# Patient Record
Sex: FEMALE | Race: WHITE | NOT HISPANIC OR LATINO | URBAN - METROPOLITAN AREA
[De-identification: names, ages, dates, MRNs, and addresses within clinical notes are randomized per-mention and may not be internally consistent; named-entity substitution may affect disease eponyms.]

---
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
## 2022-10-12 ENCOUNTER — APPOINTMENT (OUTPATIENT)
Dept: ULTRASOUND IMAGING | Facility: HOSPITAL | Age: 22
End: 2022-10-12

## 2022-10-12 ENCOUNTER — HOSPITAL ENCOUNTER (EMERGENCY)
Facility: HOSPITAL | Age: 22
Discharge: HOME | End: 2022-10-12
Attending: EMERGENCY MEDICINE | Admitting: EMERGENCY MEDICINE

## 2022-10-12 VITALS
WEIGHT: 173.72 LBS | HEIGHT: 66 IN | DIASTOLIC BLOOD PRESSURE: 84 MMHG | OXYGEN SATURATION: 100 % | RESPIRATION RATE: 19 BRPM | BODY MASS INDEX: 27.92 KG/M2 | HEART RATE: 116 BPM | SYSTOLIC BLOOD PRESSURE: 144 MMHG | TEMPERATURE: 98.2 F

## 2022-10-12 DIAGNOSIS — N83.202 LEFT OVARIAN CYST: Primary | ICD-10-CM

## 2022-10-12 LAB
ALBUMIN SERPL-MCNC: 4.9 G/DL (ref 3.5–5.2)
ALBUMIN/GLOB SERPL: 1.6 G/DL
ALP SERPL-CCNC: 96 U/L (ref 39–117)
ALT SERPL W P-5'-P-CCNC: 11 U/L (ref 1–33)
ANION GAP SERPL CALCULATED.3IONS-SCNC: 11.1 MMOL/L (ref 5–15)
AST SERPL-CCNC: 14 U/L (ref 1–32)
BACTERIA UR QL AUTO: ABNORMAL /HPF
BASOPHILS # BLD AUTO: 0.06 10*3/MM3 (ref 0–0.2)
BASOPHILS NFR BLD AUTO: 0.5 % (ref 0–1.5)
BILIRUB SERPL-MCNC: 0.2 MG/DL (ref 0–1.2)
BILIRUB UR QL STRIP: NEGATIVE
BUN SERPL-MCNC: 6 MG/DL (ref 6–20)
BUN/CREAT SERPL: 7.8 (ref 7–25)
CALCIUM SPEC-SCNC: 9.3 MG/DL (ref 8.6–10.5)
CHLORIDE SERPL-SCNC: 105 MMOL/L (ref 98–107)
CLARITY UR: CLEAR
CO2 SERPL-SCNC: 24.9 MMOL/L (ref 22–29)
COLOR UR: YELLOW
CREAT SERPL-MCNC: 0.77 MG/DL (ref 0.57–1)
DEPRECATED RDW RBC AUTO: 39.8 FL (ref 37–54)
EGFRCR SERPLBLD CKD-EPI 2021: 113.4 ML/MIN/1.73
EOSINOPHIL # BLD AUTO: 0.09 10*3/MM3 (ref 0–0.4)
EOSINOPHIL NFR BLD AUTO: 0.8 % (ref 0.3–6.2)
ERYTHROCYTE [DISTWIDTH] IN BLOOD BY AUTOMATED COUNT: 12.3 % (ref 12.3–15.4)
GLOBULIN UR ELPH-MCNC: 3 GM/DL
GLUCOSE BLDC GLUCOMTR-MCNC: 78 MG/DL (ref 70–99)
GLUCOSE SERPL-MCNC: 113 MG/DL (ref 65–99)
GLUCOSE UR STRIP-MCNC: NEGATIVE MG/DL
HCG INTACT+B SERPL-ACNC: <0.5 MIU/ML
HCT VFR BLD AUTO: 41.6 % (ref 34–46.6)
HGB BLD-MCNC: 14.2 G/DL (ref 12–15.9)
HGB UR QL STRIP.AUTO: NEGATIVE
HOLD SPECIMEN: NORMAL
HOLD SPECIMEN: NORMAL
HYALINE CASTS UR QL AUTO: ABNORMAL /LPF
IMM GRANULOCYTES # BLD AUTO: 0.02 10*3/MM3 (ref 0–0.05)
IMM GRANULOCYTES NFR BLD AUTO: 0.2 % (ref 0–0.5)
KETONES UR QL STRIP: NEGATIVE
LEUKOCYTE ESTERASE UR QL STRIP.AUTO: ABNORMAL
LIPASE SERPL-CCNC: 27 U/L (ref 13–60)
LYMPHOCYTES # BLD AUTO: 2.68 10*3/MM3 (ref 0.7–3.1)
LYMPHOCYTES NFR BLD AUTO: 22.6 % (ref 19.6–45.3)
MCH RBC QN AUTO: 29.9 PG (ref 26.6–33)
MCHC RBC AUTO-ENTMCNC: 34.1 G/DL (ref 31.5–35.7)
MCV RBC AUTO: 87.6 FL (ref 79–97)
MONOCYTES # BLD AUTO: 0.42 10*3/MM3 (ref 0.1–0.9)
MONOCYTES NFR BLD AUTO: 3.5 % (ref 5–12)
NEUTROPHILS NFR BLD AUTO: 72.4 % (ref 42.7–76)
NEUTROPHILS NFR BLD AUTO: 8.57 10*3/MM3 (ref 1.7–7)
NITRITE UR QL STRIP: NEGATIVE
NRBC BLD AUTO-RTO: 0 /100 WBC (ref 0–0.2)
PH UR STRIP.AUTO: 6.5 [PH] (ref 5–8)
PLATELET # BLD AUTO: 411 10*3/MM3 (ref 140–450)
PMV BLD AUTO: 9.5 FL (ref 6–12)
POTASSIUM SERPL-SCNC: 3.9 MMOL/L (ref 3.5–5.2)
PROT SERPL-MCNC: 7.9 G/DL (ref 6–8.5)
PROT UR QL STRIP: NEGATIVE
RBC # BLD AUTO: 4.75 10*6/MM3 (ref 3.77–5.28)
RBC # UR STRIP: ABNORMAL /HPF
REF LAB TEST METHOD: ABNORMAL
SODIUM SERPL-SCNC: 141 MMOL/L (ref 136–145)
SP GR UR STRIP: <=1.005 (ref 1–1.03)
SQUAMOUS #/AREA URNS HPF: ABNORMAL /HPF
UROBILINOGEN UR QL STRIP: ABNORMAL
WBC # UR STRIP: ABNORMAL /HPF
WBC NRBC COR # BLD: 11.84 10*3/MM3 (ref 3.4–10.8)
WHOLE BLOOD HOLD COAG: NORMAL
WHOLE BLOOD HOLD SPECIMEN: NORMAL

## 2022-10-12 PROCEDURE — 85025 COMPLETE CBC W/AUTO DIFF WBC: CPT

## 2022-10-12 PROCEDURE — 84702 CHORIONIC GONADOTROPIN TEST: CPT

## 2022-10-12 PROCEDURE — 99283 EMERGENCY DEPT VISIT LOW MDM: CPT

## 2022-10-12 PROCEDURE — 82962 GLUCOSE BLOOD TEST: CPT

## 2022-10-12 PROCEDURE — 83690 ASSAY OF LIPASE: CPT

## 2022-10-12 PROCEDURE — 81001 URINALYSIS AUTO W/SCOPE: CPT

## 2022-10-12 PROCEDURE — 80053 COMPREHEN METABOLIC PANEL: CPT

## 2022-10-12 PROCEDURE — 36415 COLL VENOUS BLD VENIPUNCTURE: CPT

## 2022-10-12 PROCEDURE — 76830 TRANSVAGINAL US NON-OB: CPT

## 2022-10-12 RX ORDER — SODIUM CHLORIDE 0.9 % (FLUSH) 0.9 %
10 SYRINGE (ML) INJECTION AS NEEDED
Status: DISCONTINUED | OUTPATIENT
Start: 2022-10-12 | End: 2022-10-12 | Stop reason: HOSPADM

## 2022-10-12 NOTE — ED PROVIDER NOTES
Time: 4:46 PM EDT  Chief Complaint:   Chief Complaint   Patient presents with   • Abdominal Pain   • Dizziness           History of Present Illness:  Patient is a 22 y.o. year old female who presents to the emergency department with L pelvic pain x 2 weeks. LMP august 20th, states regular cycles. No birth control. No vaginal bleeding. Hx of endometriosis, Denies ovation cyst.             History provided by:  Patient  Abdominal Pain  Pain location:  LLQ  Pain quality: cramping    Pain radiates to:  Does not radiate  Pain severity:  Moderate  Onset quality:  Sudden  Duration:  2 weeks  Timing:  Intermittent  Progression:  Unchanged  Chronicity:  New  Context: not alcohol use, not awakening from sleep, not diet changes, not eating, not laxative use, not medication withdrawal, not previous surgeries, not recent illness, not recent sexual activity, not recent travel, not retching, not sick contacts, not suspicious food intake and not trauma    Relieved by:  Nothing  Worsened by:  Nothing  Ineffective treatments:  None tried  Associated symptoms: nausea    Associated symptoms: no anorexia, no belching, no chest pain, no chills, no constipation, no cough, no diarrhea, no dysuria, no fatigue, no fever, no flatus, no hematemesis, no hematochezia, no hematuria, no melena, no shortness of breath, no sore throat, no vaginal bleeding, no vaginal discharge and no vomiting            Patient Care Team  Primary Care Provider: Provider, No Known    Past Medical History:     No Known Allergies  No past medical history on file.  No past surgical history on file.  Family History   Problem Relation Age of Onset   • Diabetes Mother    • Heart failure Mother        Home Medications:  Prior to Admission medications    Medication Sig Start Date End Date Taking? Authorizing Provider   hydrOXYzine (ATARAX) 25 MG tablet Take 25 mg by mouth 3 (Three) Times a Day As Needed for Itching.    Emergency, Nurse Ganesh, RN   metoprolol tartrate  "(LOPRESSOR) 25 MG tablet Take 25 mg by mouth 2 (Two) Times a Day.    Emergency, Nurse Ganesh, RN        Social History:   Social History     Tobacco Use   • Smoking status: Never   • Smokeless tobacco: Never         Review of Systems:  Review of Systems   Constitutional: Negative for chills, fatigue and fever.   HENT: Negative for congestion, ear pain and sore throat.    Eyes: Negative for pain.   Respiratory: Negative for cough, chest tightness and shortness of breath.    Cardiovascular: Negative for chest pain.   Gastrointestinal: Positive for abdominal pain and nausea. Negative for anorexia, constipation, diarrhea, flatus, hematemesis, hematochezia, melena and vomiting.   Genitourinary: Positive for pelvic pain. Negative for dysuria, flank pain, hematuria, vaginal bleeding and vaginal discharge.   Musculoskeletal: Negative for joint swelling.   Skin: Negative for pallor.   Neurological: Negative for seizures and headaches.   All other systems reviewed and are negative.       Physical Exam:  /84 (BP Location: Left arm, Patient Position: Sitting)   Pulse 116   Temp 98.2 °F (36.8 °C) (Oral)   Resp 19   Ht 167.6 cm (66\")   Wt 78.8 kg (173 lb 11.6 oz)   SpO2 100%   BMI 28.04 kg/m²     Physical Exam  Vitals and nursing note reviewed.   Constitutional:       General: She is not in acute distress.     Appearance: Normal appearance. She is not toxic-appearing.   HENT:      Head: Normocephalic and atraumatic.      Mouth/Throat:      Mouth: Mucous membranes are moist.   Eyes:      General: No scleral icterus.     Pupils: Pupils are equal, round, and reactive to light.   Cardiovascular:      Rate and Rhythm: Normal rate and regular rhythm.      Pulses: Normal pulses.      Heart sounds: Normal heart sounds.   Pulmonary:      Effort: Pulmonary effort is normal. No respiratory distress.      Breath sounds: Normal breath sounds.   Abdominal:      General: Abdomen is flat. There is no distension.      Palpations: " Abdomen is soft.      Tenderness: There is no abdominal tenderness.   Musculoskeletal:         General: Normal range of motion.      Cervical back: Normal range of motion and neck supple.   Skin:     General: Skin is warm and dry.   Neurological:      General: No focal deficit present.      Mental Status: She is alert and oriented to person, place, and time. Mental status is at baseline.   Psychiatric:         Mood and Affect: Mood normal.         Behavior: Behavior normal.                Medications in the Emergency Department:  Medications   sodium chloride 0.9 % flush 10 mL (has no administration in time range)        Labs  Lab Results (last 24 hours)     Procedure Component Value Units Date/Time    CBC & Differential [[id number]]  (Abnormal) Collected: 10/12/22 1552    Specimen: Blood Updated: 10/12/22 1600    Narrative:      The following orders were created for panel order CBC & Differential.  Procedure                               Abnormality         Status                     ---------                               -----------         ------                     CBC Auto Differential[[id number]]        Abnormal            Final result                 Please view results for these tests on the individual orders.    Comprehensive Metabolic Panel [[id number]]  (Abnormal) Collected: 10/12/22 1552    Specimen: Blood Updated: 10/12/22 1621     Glucose 113 mg/dL      BUN 6 mg/dL      Creatinine 0.77 mg/dL      Sodium 141 mmol/L      Potassium 3.9 mmol/L      Chloride 105 mmol/L      CO2 24.9 mmol/L      Calcium 9.3 mg/dL      Total Protein 7.9 g/dL      Albumin 4.90 g/dL      ALT (SGPT) 11 U/L      AST (SGOT) 14 U/L      Alkaline Phosphatase 96 U/L      Total Bilirubin 0.2 mg/dL      Globulin 3.0 gm/dL      A/G Ratio 1.6 g/dL      BUN/Creatinine Ratio 7.8     Anion Gap 11.1 mmol/L      eGFR 113.4 mL/min/1.73      Comment: National Kidney Foundation and American Society of Nephrology (ASN) Task Force recommended  calculation based on the Chronic Kidney Disease Epidemiology Collaboration (CKD-EPI) equation refit without adjustment for race.       Narrative:      GFR Normal >60  Chronic Kidney Disease <60  Kidney Failure <15      Lipase [[id number]]  (Normal) Collected: 10/12/22 1552    Specimen: Blood Updated: 10/12/22 1621     Lipase 27 U/L     hCG, Quantitative, Pregnancy [[id number]] Collected: 10/12/22 1552    Specimen: Blood Updated: 10/12/22 1619     HCG Quantitative <0.50 mIU/mL     Narrative:      HCG Ranges by Gestational Age    Females - non-pregnant premenopausal   </= 1mIU/mL HCG  Females - postmenopausal               </= 7mIU/mL HCG    3 Weeks       5.4   -      72 mIU/mL  4 Weeks      10.2   -     708 mIU/mL  5 Weeks       217   -   8,245 mIU/mL  6 Weeks       152   -  32,177 mIU/mL  7 Weeks     4,059   - 153,767 mIU/mL  8 Weeks    31,366   - 149,094 mIU/mL  9 Weeks    59,109   - 135,901 mIU/mL  10 Weeks   44,186   - 170,409 mIU/mL  12 Weeks   27,107   - 201,615 mIU/mL  14 Weeks   24,302   -  93,646 mIU/mL  15 Weeks   12,540   -  69,747 mIU/mL  16 Weeks    8,904   -  55,332 mIU/mL  17 Weeks    8,240   -  51,793 mIU/mL  18 Weeks    9,649   -  55,271 mIU/mL    Results may be falsely decreased if patient taking Biotin.      CBC Auto Differential [[id number]]  (Abnormal) Collected: 10/12/22 1552    Specimen: Blood Updated: 10/12/22 1600     WBC 11.84 10*3/mm3      RBC 4.75 10*6/mm3      Hemoglobin 14.2 g/dL      Hematocrit 41.6 %      MCV 87.6 fL      MCH 29.9 pg      MCHC 34.1 g/dL      RDW 12.3 %      RDW-SD 39.8 fl      MPV 9.5 fL      Platelets 411 10*3/mm3      Neutrophil % 72.4 %      Lymphocyte % 22.6 %      Monocyte % 3.5 %      Eosinophil % 0.8 %      Basophil % 0.5 %      Immature Grans % 0.2 %      Neutrophils, Absolute 8.57 10*3/mm3      Lymphocytes, Absolute 2.68 10*3/mm3      Monocytes, Absolute 0.42 10*3/mm3      Eosinophils, Absolute 0.09 10*3/mm3      Basophils, Absolute 0.06 10*3/mm3      Immature  Grans, Absolute 0.02 10*3/mm3      nRBC 0.0 /100 WBC     Urinalysis With Microscopic If Indicated (No Culture) - Urine, Clean Catch [[id number]]  (Abnormal) Collected: 10/12/22 1623    Specimen: Urine, Clean Catch Updated: 10/12/22 1635     Color, UA Yellow     Appearance, UA Clear     pH, UA 6.5     Specific Gravity, UA <=1.005     Glucose, UA Negative     Ketones, UA Negative     Bilirubin, UA Negative     Blood, UA Negative     Protein, UA Negative     Leuk Esterase, UA Trace     Nitrite, UA Negative     Urobilinogen, UA 0.2 E.U./dL    Urinalysis, Microscopic Only - Urine, Clean Catch [[id number]]  (Abnormal) Collected: 10/12/22 1623    Specimen: Urine, Clean Catch Updated: 10/12/22 1635     RBC, UA 0-2 /HPF      WBC, UA 0-2 /HPF      Bacteria, UA None Seen /HPF      Squamous Epithelial Cells, UA 0-2 /HPF      Hyaline Casts, UA None Seen /LPF      Methodology Automated Microscopy    POC Glucose Once [[id number]]  (Normal) Collected: 10/12/22 2031    Specimen: Blood Updated: 10/12/22 2033     Glucose 78 mg/dL      Comment: Serial Number: [id number]:  [id number]              Imaging:  US Non-ob Transvaginal    Result Date: 10/12/2022  PROCEDURE: US NON-OB TRANSVAGINAL  COMPARISON: None  INDICATIONS: pelvic pain  TECHNIQUE: Ultrasound examination of the pelvis was performed, using endovaginal technique.   FINDINGS:  The uterus measures 7.3 cm x 3.7 cm by 4.5 cm.  No focal uterine abnormality is seen.  The endometrial echo measures 1.0 cm in thickness which is within normal limits for a patient of this age.  A small amount of free pelvic fluid is noted.  The right ovary appears within normal limits with no cyst or mass identified.  Normal ovarian blood flow is noted.  On the left ovary is a 1.3 cm cystic focus .  There is prominent circumferential low blood flow around the focus.  No embryo is identified within the cyst on the provided images.  Normal blood flow is noted elsewhere in the ovary.        1. 1.3 cm  cystic focus on the left ovary may represent a corpus luteal cyst or ectopic pregnancy.  Correlate with hCG levels. 2. Small amount of free pelvic fluid     Derrick Joshua M.D.       Electronically Signed and Approved By: Derrick Joshua M.D. on 10/12/2022 at 17:48               Procedures:  Procedures    Progress  ED Course as of 10/12/22 2133   Wed Oct 12, 2022   1648 --- PROVIDER IN TRIAGE NOTE ---    The patient was evaluated my meAntonia in triage. Orders were placed and the patient is currently awaiting disposition.    [AJ]      ED Course User Index  [AJ] Antonia Bird PA-C                            The patient was initially evaluated in the triage area where orders were placed. The patient was later dispositioned by TYLER Anthony.      The patient was advised to stay for completion of workup which includes but is not limited to communication of labs and radiological results, reassessment and plan. The patient was advised that leaving prior to disposition by a provider could result in critical findings that are not communicated to the patient.     Medical Decision Making:  MDM  Number of Diagnoses or Management Options  Left ovarian cyst: new and requires workup  Diagnosis management comments: The patient is resting comfortably and feels better, is alert and in no distress. Repeat examination is unremarkable and benign; in particular, there's no discomfort at McBurney's point and there is no pulsatile mass. The history, exam, diagnostic testing, and current condition does not suggest acute appendicitis, bowel obstruction, acute cholecystitis, bowel perforation, major gastrointestinal bleeding, severe diverticulitis, abdominal aortic aneurysm, mesenteric ischemia, volvulus, sepsis, or other significant pathology that warrants further testing, continued ED treatment, admission, for surgical evaluation at this point. The vital signs have been stable. The patient does not have uncontrollable  pain, intractable vomiting, or other significant symptoms. The patient's condition is stable and appropriate for discharge from the emergency department.       Amount and/or Complexity of Data Reviewed  Clinical lab tests: reviewed  Tests in the radiology section of CPT®: reviewed    Risk of Complications, Morbidity, and/or Mortality  Presenting problems: moderate  Diagnostic procedures: low  Management options: low    Patient Progress  Patient progress: stable           The following orders were placed after triage and evaluation:  Orders Placed This Encounter   Procedures   • US Non-ob Transvaginal   • [place] Draw   • Comprehensive Metabolic Panel   • Lipase   • Urinalysis With Microscopic If Indicated (No Culture) - Urine, Clean Catch   • hCG, Quantitative, Pregnancy   • CBC Auto Differential   • Urinalysis, Microscopic Only - Urine, Clean Catch   • NPO Diet NPO Type: Strict NPO   • Undress & Gown   • POC Glucose Once   • Insert Peripheral IV   • CBC & Differential   • Green Top (Gel)   • Lavender Top   • Gold Top - SST   • Light Blue Top       Final diagnoses:   Left ovarian cyst          Disposition:  ED Disposition     ED Disposition   Discharge    Condition   Stable    Comment   Pt ambulatory to lobby. Denies any needs at this time.             This medical record created using voice recognition software.           Cesar Hanson, APRN  10/12/22 3971

## 2022-10-13 NOTE — DISCHARGE INSTRUCTIONS
Follow up with your Gynecologist for further evalaution. Return to the ER for worsening pain, fever or any concerns. Take Midol/Aleve for pain.

## 2024-04-10 PROCEDURE — 87480 CANDIDA DNA DIR PROBE: CPT | Performed by: NURSE PRACTITIONER

## 2024-04-10 PROCEDURE — 87591 N.GONORRHOEAE DNA AMP PROB: CPT | Performed by: NURSE PRACTITIONER

## 2024-04-10 PROCEDURE — 87491 CHLMYD TRACH DNA AMP PROBE: CPT | Performed by: NURSE PRACTITIONER

## 2024-04-10 PROCEDURE — 87510 GARDNER VAG DNA DIR PROBE: CPT | Performed by: NURSE PRACTITIONER

## 2024-04-10 PROCEDURE — 87660 TRICHOMONAS VAGIN DIR PROBE: CPT | Performed by: NURSE PRACTITIONER

## 2024-04-11 ENCOUNTER — TELEPHONE (OUTPATIENT)
Dept: URGENT CARE | Facility: CLINIC | Age: 24
End: 2024-04-11
Payer: COMMERCIAL

## 2024-04-11 NOTE — TELEPHONE ENCOUNTER
----- Message from TYLER Maya sent at 4/11/2024  2:50 PM EDT -----  Please call the patient regarding her abnormal result.    Please call the patient tell her that she has positive for bacterial vaginosis.  Please let her know that she tested negative for chlamydia, gonorrhea, trichomonas, and yeast.    I have sent in a prescription for the patient for metronidazole otherwise known as Flagyl to be taken twice a day for 7 days.  This was called in to [facility] pharmacy in [place].  Please inform patient that while she is taking this medication she should avoid all alcohol and alcohol-containing products as it may cause a very serious and painful side effect.

## 2024-04-11 NOTE — TELEPHONE ENCOUNTER
----- Message from TYLER Maya sent at 4/11/2024  2:50 PM EDT -----  Please call the patient regarding her abnormal result.    Please call the patient tell her that she has positive for bacterial vaginosis.  Please let her know that she tested negative for chlamydia, gonorrhea, trichomonas, and yeast.    I have sent in a prescription for the patient for metronidazole otherwise known as Flagyl to be taken twice a day for 7 days.  This was called in to [facility] pharmacy in [place].  Please inform patient that while she is taking this medication she should avoid all alcohol and alcohol-containing products as it may cause a very serious and painful side effect.

## 2024-12-31 ENCOUNTER — HOSPITAL ENCOUNTER (EMERGENCY)
Facility: HOSPITAL | Age: 24
Discharge: HOME | End: 2024-12-31
Attending: EMERGENCY MEDICINE
Payer: MEDICAID

## 2024-12-31 ENCOUNTER — APPOINTMENT (OUTPATIENT)
Dept: ULTRASOUND IMAGING | Facility: HOSPITAL | Age: 24
End: 2024-12-31
Payer: MEDICAID

## 2024-12-31 VITALS
OXYGEN SATURATION: 99 % | HEIGHT: 66 IN | BODY MASS INDEX: 30.4 KG/M2 | DIASTOLIC BLOOD PRESSURE: 69 MMHG | HEART RATE: 89 BPM | SYSTOLIC BLOOD PRESSURE: 134 MMHG | RESPIRATION RATE: 18 BRPM | TEMPERATURE: 98.3 F | WEIGHT: 189.15 LBS

## 2024-12-31 DIAGNOSIS — Z34.90 EARLY STAGE OF PREGNANCY: Primary | ICD-10-CM

## 2024-12-31 LAB
ABO GROUP BLD: NORMAL
BACTERIA UR QL AUTO: NORMAL /HPF
BASOPHILS # BLD AUTO: 0.06 10*3/MM3 (ref 0–0.2)
BASOPHILS NFR BLD AUTO: 0.4 % (ref 0–1.5)
BILIRUB UR QL STRIP: NEGATIVE
BLD GP AB SCN SERPL QL: NEGATIVE
CLARITY UR: CLEAR
COLOR UR: YELLOW
DEPRECATED RDW RBC AUTO: 39.5 FL (ref 37–54)
EOSINOPHIL # BLD AUTO: 0.24 10*3/MM3 (ref 0–0.4)
EOSINOPHIL NFR BLD AUTO: 1.8 % (ref 0.3–6.2)
ERYTHROCYTE [DISTWIDTH] IN BLOOD BY AUTOMATED COUNT: 12.2 % (ref 12.3–15.4)
GLUCOSE UR STRIP-MCNC: NEGATIVE MG/DL
HCG INTACT+B SERPL-ACNC: NORMAL MIU/ML
HCT VFR BLD AUTO: 39.8 % (ref 34–46.6)
HGB BLD-MCNC: 13.3 G/DL (ref 12–15.9)
HGB UR QL STRIP.AUTO: NEGATIVE
HOLD SPECIMEN: NORMAL
HOLD SPECIMEN: NORMAL
HYALINE CASTS UR QL AUTO: NORMAL /LPF
IMM GRANULOCYTES # BLD AUTO: 0.04 10*3/MM3 (ref 0–0.05)
IMM GRANULOCYTES NFR BLD AUTO: 0.3 % (ref 0–0.5)
KETONES UR QL STRIP: NEGATIVE
LEUKOCYTE ESTERASE UR QL STRIP.AUTO: ABNORMAL
LYMPHOCYTES # BLD AUTO: 2.83 10*3/MM3 (ref 0.7–3.1)
LYMPHOCYTES NFR BLD AUTO: 20.7 % (ref 19.6–45.3)
MCH RBC QN AUTO: 29.5 PG (ref 26.6–33)
MCHC RBC AUTO-ENTMCNC: 33.4 G/DL (ref 31.5–35.7)
MCV RBC AUTO: 88.2 FL (ref 79–97)
MONOCYTES # BLD AUTO: 0.75 10*3/MM3 (ref 0.1–0.9)
MONOCYTES NFR BLD AUTO: 5.5 % (ref 5–12)
NEUTROPHILS NFR BLD AUTO: 71.3 % (ref 42.7–76)
NEUTROPHILS NFR BLD AUTO: 9.78 10*3/MM3 (ref 1.7–7)
NITRITE UR QL STRIP: NEGATIVE
NRBC BLD AUTO-RTO: 0 /100 WBC (ref 0–0.2)
NUMBER OF DOSES: NORMAL
PH UR STRIP.AUTO: 7 [PH] (ref 5–8)
PLATELET # BLD AUTO: 419 10*3/MM3 (ref 140–450)
PMV BLD AUTO: 9.4 FL (ref 6–12)
PROT UR QL STRIP: NEGATIVE
RBC # BLD AUTO: 4.51 10*6/MM3 (ref 3.77–5.28)
RBC # UR STRIP: NORMAL /HPF
REF LAB TEST METHOD: NORMAL
RH BLD: POSITIVE
SP GR UR STRIP: <=1.005 (ref 1–1.03)
SQUAMOUS #/AREA URNS HPF: NORMAL /HPF
UROBILINOGEN UR QL STRIP: ABNORMAL
WBC # UR STRIP: NORMAL /HPF
WBC NRBC COR # BLD AUTO: 13.7 10*3/MM3 (ref 3.4–10.8)
WHOLE BLOOD HOLD COAG: NORMAL
WHOLE BLOOD HOLD SPECIMEN: NORMAL

## 2024-12-31 PROCEDURE — 99284 EMERGENCY DEPT VISIT MOD MDM: CPT

## 2024-12-31 PROCEDURE — 86900 BLOOD TYPING SEROLOGIC ABO: CPT

## 2024-12-31 PROCEDURE — 86850 RBC ANTIBODY SCREEN: CPT

## 2024-12-31 PROCEDURE — 81001 URINALYSIS AUTO W/SCOPE: CPT

## 2024-12-31 PROCEDURE — 84702 CHORIONIC GONADOTROPIN TEST: CPT

## 2024-12-31 PROCEDURE — 86901 BLOOD TYPING SEROLOGIC RH(D): CPT

## 2024-12-31 PROCEDURE — 76817 TRANSVAGINAL US OBSTETRIC: CPT

## 2024-12-31 PROCEDURE — 85025 COMPLETE CBC W/AUTO DIFF WBC: CPT

## 2024-12-31 PROCEDURE — 36415 COLL VENOUS BLD VENIPUNCTURE: CPT

## 2024-12-31 RX ORDER — SODIUM CHLORIDE 0.9 % (FLUSH) 0.9 %
10 SYRINGE (ML) INJECTION AS NEEDED
Status: DISCONTINUED | OUTPATIENT
Start: 2024-12-31 | End: 2024-12-31

## 2024-12-31 RX ORDER — PNV NO.95/FERROUS FUM/FOLIC AC 28MG-0.8MG
1 TABLET ORAL DAILY
Qty: 30 TABLET | Refills: 0 | Status: SHIPPED | OUTPATIENT
Start: 2024-12-31 | End: 2025-01-30

## 2024-12-31 NOTE — ED PROVIDER NOTES
Time: 2:41 PM EST  Date of encounter:  12/31/2024  Independent Historian/Clinical History and Information was obtained by:   Patient    History is limited by: N/A    Chief Complaint   Patient presents with    Abdominal Pain    Vaginal Bleeding - Pregnant         History of Present Illness:  Patient is a 24 y.o. year old female who presents to the emergency department for evaluation of abdominal pain and vaginal bleeding.  Patient states that she is approximately 6 weeks pregnant and started experiencing abdominal cramping a few days ago, then this morning as she woke up she had a small amount of bleeding when wiping.  Pain is described only in the left lower quadrant.  LMP 11/05/2024.  Patient has her first OB/GYN appointment at the end of January.  Denies fever, nausea, vomiting.  Denies dizziness or lightheadedness.    Patient Care Team  Primary Care Provider: Rosemarie Mathis APRN    Past Medical History:     Allergies   Allergen Reactions    Shrimp Anaphylaxis    Tree Nut Anaphylaxis     Peanuts also     Past Medical History:   Diagnosis Date    Anxiety     Borderline personality disorder     PTSD (post-traumatic stress disorder)      History reviewed. No pertinent surgical history.  Family History   Problem Relation Age of Onset    Diabetes Mother     Heart failure Mother        Home Medications:  Prior to Admission medications    Medication Sig Start Date End Date Taking? Authorizing Provider   OXcarbazepine (TRILEPTAL) 300 MG tablet Take 1 tablet by mouth 2 (Two) Times a Day.    Provider, Marianne, MD        Social History:   Social History     Tobacco Use    Smoking status: Never    Smokeless tobacco: Never   Vaping Use    Vaping status: Former   Substance Use Topics    Alcohol use: Never    Drug use: Defer         Review of Systems:  Review of Systems   Constitutional: Negative.    HENT: Negative.     Eyes: Negative.    Respiratory: Negative.     Cardiovascular: Negative.    Gastrointestinal:  Positive  "for abdominal pain.   Endocrine: Negative.    Genitourinary:  Positive for vaginal bleeding.   Musculoskeletal: Negative.    Skin: Negative.    Allergic/Immunologic: Negative.    Neurological: Negative.    Hematological: Negative.    Psychiatric/Behavioral: Negative.          Physical Exam:  /69 (BP Location: Right arm, Patient Position: Sitting)   Pulse 89   Temp 98.3 °F (36.8 °C) (Oral)   Resp 18   Ht 167.6 cm (66\")   Wt 85.8 kg (189 lb 2.5 oz)   SpO2 99%   BMI 30.53 kg/m²         Physical Exam  Vitals and nursing note reviewed.   Constitutional:       General: She is not in acute distress.     Appearance: Normal appearance. She is not toxic-appearing.   HENT:      Head: Normocephalic and atraumatic.      Jaw: There is normal jaw occlusion.      Nose: Nose normal.      Mouth/Throat:      Mouth: Mucous membranes are moist.      Pharynx: Oropharynx is clear.   Eyes:      General: Lids are normal.      Extraocular Movements: Extraocular movements intact.      Conjunctiva/sclera: Conjunctivae normal.      Pupils: Pupils are equal, round, and reactive to light.   Cardiovascular:      Rate and Rhythm: Normal rate and regular rhythm.      Pulses: Normal pulses.      Heart sounds: Normal heart sounds.   Pulmonary:      Effort: Pulmonary effort is normal. No respiratory distress.      Breath sounds: Normal breath sounds. No stridor. No wheezing, rhonchi or rales.   Abdominal:      General: Abdomen is flat. Bowel sounds are normal.      Palpations: Abdomen is soft.      Tenderness: There is abdominal tenderness in the suprapubic area and left lower quadrant. There is no right CVA tenderness, left CVA tenderness, guarding or rebound.   Musculoskeletal:         General: Normal range of motion.      Cervical back: Normal range of motion and neck supple.      Right lower leg: No edema.      Left lower leg: No edema.   Skin:     General: Skin is warm and dry.   Neurological:      Mental Status: She is alert and " oriented to person, place, and time. Mental status is at baseline.   Psychiatric:         Mood and Affect: Mood normal.                      Medical Decision Making:      Comorbidities that affect care:    Anxiety, borderline personality disorder, PTSD    External Notes reviewed:    Previous Clinic Note: Patient was last seen by her PCM for evaluation of COVID-19.      The following orders were placed and all results were independently analyzed by me:  Orders Placed This Encounter   Procedures    US Ob Transvaginal    [place] Draw    hCG, Quantitative, Pregnancy    CBC Auto Differential    Urinalysis With Microscopic If Indicated (No Culture) - Urine, Clean Catch    Urinalysis, Microscopic Only - Urine, Clean Catch    NPO Diet NPO Type: Strict NPO    Undress & Gown    Vital Signs    Supplies To Bedside - Notify MD When Ready- Pelvic Cart / Set Up     RhIg Evaluation    Doses of Rh Immune Globulin    CBC & Differential    Green Top (Gel)    Lavender Top    Gold Top - SST    Light Blue Top       Medications Given in the Emergency Department:  Medications - No data to display       ED Course:    The patient was initially evaluated in the triage area where orders were placed. The patient was later dispositioned by TYLER Roque.      The patient was advised to stay for completion of workup which includes but is not limited to communication of labs and radiological results, reassessment and plan. The patient was advised that leaving prior to disposition by a provider could result in critical findings that are not communicated to the patient.          Labs:    Lab Results (last 24 hours)       Procedure Component Value Units Date/Time    CBC & Differential [[id number]]  (Abnormal) Collected: 24 1308    Specimen: Blood from Arm, Left Updated: 24 1320    Narrative:      The following orders were created for panel order CBC & Differential.  Procedure                               Abnormality          Status                     ---------                               -----------         ------                     CBC Auto Differential[[id number]]        Abnormal            Final result                 Please view results for these tests on the individual orders.    hCG, Quantitative, Pregnancy [[id number]] Collected: 12/31/24 1308    Specimen: Blood from Arm, Left Updated: 12/31/24 1350     HCG Quantitative 10,885.00 mIU/mL     Narrative:      HCG Ranges by Gestational Age    Females - non-pregnant premenopausal   </= 1mIU/mL HCG  Females - postmenopausal               </= 7mIU/mL HCG    3 Weeks       5.4   -      72 mIU/mL  4 Weeks      10.2   -     708 mIU/mL  5 Weeks       217   -   8,245 mIU/mL  6 Weeks       152   -  32,177 mIU/mL  7 Weeks     4,059   - 153,767 mIU/mL  8 Weeks    31,366   - 149,094 mIU/mL  9 Weeks    59,109   - 135,901 mIU/mL  10 Weeks   44,186   - 170,409 mIU/mL  12 Weeks   27,107   - 201,615 mIU/mL  14 Weeks   24,302   -  93,646 mIU/mL  15 Weeks   12,540   -  69,747 mIU/mL  16 Weeks    8,904   -  55,332 mIU/mL  17 Weeks    8,240   -  51,793 mIU/mL  18 Weeks    9,649   -  55,271 mIU/mL      CBC Auto Differential [[id number]]  (Abnormal) Collected: 12/31/24 1308    Specimen: Blood from Arm, Left Updated: 12/31/24 1320     WBC 13.70 10*3/mm3      RBC 4.51 10*6/mm3      Hemoglobin 13.3 g/dL      Hematocrit 39.8 %      MCV 88.2 fL      MCH 29.5 pg      MCHC 33.4 g/dL      RDW 12.2 %      RDW-SD 39.5 fl      MPV 9.4 fL      Platelets 419 10*3/mm3      Neutrophil % 71.3 %      Lymphocyte % 20.7 %      Monocyte % 5.5 %      Eosinophil % 1.8 %      Basophil % 0.4 %      Immature Grans % 0.3 %      Neutrophils, Absolute 9.78 10*3/mm3      Lymphocytes, Absolute 2.83 10*3/mm3      Monocytes, Absolute 0.75 10*3/mm3      Eosinophils, Absolute 0.24 10*3/mm3      Basophils, Absolute 0.06 10*3/mm3      Immature Grans, Absolute 0.04 10*3/mm3      nRBC 0.0 /100 WBC     Urinalysis With Microscopic If Indicated  (No Culture) - Urine, Clean Catch [[id number]]  (Abnormal) Collected: 12/31/24 1516    Specimen: Urine, Clean Catch Updated: 12/31/24 1530     Color, UA Yellow     Appearance, UA Clear     pH, UA 7.0     Specific Gravity, UA <=1.005     Glucose, UA Negative     Ketones, UA Negative     Bilirubin, UA Negative     Blood, UA Negative     Protein, UA Negative     Leuk Esterase, UA Trace     Nitrite, UA Negative     Urobilinogen, UA 0.2 E.U./dL    Urinalysis, Microscopic Only - Urine, Clean Catch [[id number]] Collected: 12/31/24 1516    Specimen: Urine, Clean Catch Updated: 12/31/24 1530     RBC, UA 0-2 /HPF      WBC, UA 0-2 /HPF      Bacteria, UA None Seen /HPF      Squamous Epithelial Cells, UA 0-2 /HPF      Hyaline Casts, UA None Seen /LPF      Methodology Automated Microscopy             Imaging:    US Ob Transvaginal    Result Date: 12/31/2024  US OB TRANSVAGINAL Date of Exam: 12/31/2024 3:21 PM EST Indication: abd pain, vaginal bleeding, ~6wks gestation. Comparison: 10/12/2022 Technique: Transvaginal ultrasound was performed to evaluate pregnancy.  Real time scanning was performed with multiple image documentation per protocol.  Findings: The uterus measures 9.7 x 4.7 x 5.3 cm. There is an intrauterine gestational sac noted at the uterine fundus. Gestational sac mean diameter measures 0.9 cm. Suspected tiny pole measures 0.2 cm. Yolk sac measures 0.3 cm. The right ovary measures 2.2 x 1.6 x 2.3 cm. Normal color flow at the right ovary. The left ovary measures 4.2 x 2.9 x 2.9 cm. There is a corpus luteal cyst at the left ovary which measures 2.4 x 2.7 cm. There is color flow confirmed at the left adnexa.     Impression: 1. Early intrauterine pregnancy with gestational sac, yolk sac and suspected tiny fetal pole. Recommend correlation with serial beta hCG level and short-term sonographic follow-up to confirm viability. 2. Corpus luteal cyst at the left ovary measuring 2.7 cm. 3. Normal right ovary. Electronically  Signed: Raymond Ochoa MD  12/31/2024 6:54 PM EST  Workstation ID: [id number]       Differential Diagnosis and Discussion:      Vaginal Bleeding: Differential diagnosis includes but is not limited to foreign body, tumor, vaginitis, dysfunctional uterine bleeding, endocrine abnormalities, coagulation disorder, systemic illness, polyps, complications of pregnancy (possible ectopic pregnancy).    PROCEDURES:    Labs were collected in the emergency department and all labs were reviewed and interpreted by me.  Ultrasound was performed in the emergency department and the ultrasound impression was interpreted by me.     No orders to display        Procedures    MDM  Number of Diagnoses or Management Options  Early stage of pregnancy  Diagnosis management comments: The patient presents with vaginal bleeding. Possible etiology of vaginal bleeding were considered, but not limited to; ectopic pregnancy, spontaneous miscarriage, gestational trophoblastic disease, implantation bleeding, molar pregnancy, disfunctional uterine bleeding, and fibroids. The patient is well appearing and resting comfortably. There are no indications for transfusion. After careful consideration the patient is safe and stable to be discharged home with an outpatient OB/GYN follow-up. Patient was counseled to return to the ER or follow-up with their OB/GYN in 48 hours especially for worsening of her bleeding or increased pain. The patient has expressed a clear and thorough understanding and his agreed to follow-up as instructed.  The patient´s CBC that was reviewed and interpreted by me shows no abnormalities of critical concern. Of note, there is no anemia requiring a blood transfusion and the platelet count is acceptable.  Imaging reveals intrauterine pregnancy of possible early stage.       Amount and/or Complexity of Data Reviewed  Clinical lab tests: reviewed and ordered  Tests in the radiology section of CPT®: ordered and reviewed  Decide to obtain  previous medical records or to obtain history from someone other than the patient: yes           Patient Care Considerations:    CONSULT: I considered consulting OB/GYN, however workup did not reveal emergent need for consultation.      Consultants/Shared Management Plan:    None    Social Determinants of Health:    Patient is independent, reliable, and has access to care.       Disposition and Care Coordination:    Discharged: The patient is suitable and stable for discharge with no need for consideration of admission.    I have explained the patient´s condition, diagnoses and treatment plan based on the information available to me at this time. I have answered questions and addressed any concerns. The patient has a good  understanding of the patient´s diagnosis, condition, and treatment plan as can be expected at this point. The vital signs have been stable. The patient´s condition is stable and appropriate for discharge from the emergency department.      The patient will pursue further outpatient evaluation with the primary care physician or other designated or consulting physician as outlined in the discharge instructions. They are agreeable to this plan of care and follow-up instructions have been explained in detail. The patient has received these instructions in written format and has expressed an understanding of the discharge instructions. The patient is aware that any significant change in condition or worsening of symptoms should prompt an immediate return to this or the closest emergency department or call to 911.    Final diagnoses:   Early stage of pregnancy        ED Disposition       ED Disposition   Discharge    Condition   Stable    Comment   --               This medical record created using voice recognition software.             Eva Lopez, TYLER  12/2002

## 2025-01-01 NOTE — DISCHARGE INSTRUCTIONS
Home.  I have sent a prescription for prenatal vitamins to your pharmacy.  Please  and take daily with food.  Increase your fluid intake.  Eat well-balanced meals and get plenty of rest.    Call OB/GYN of your choice to schedule a follow-up and repeat labs in 48 hours.  You may also call your primary care provider to schedule an appointment for follow-up as needed.    If symptoms worsen, change in presentation, or you develop new symptoms please seek medical attention or return to the ED for further evaluation.

## 2025-04-09 ENCOUNTER — APPOINTMENT (OUTPATIENT)
Dept: GENERAL RADIOLOGY | Facility: HOSPITAL | Age: 25
End: 2025-04-09
Payer: MEDICAID

## 2025-04-09 ENCOUNTER — HOSPITAL ENCOUNTER (EMERGENCY)
Facility: HOSPITAL | Age: 25
Discharge: HOME | End: 2025-04-09
Attending: EMERGENCY MEDICINE | Admitting: EMERGENCY MEDICINE
Payer: MEDICAID

## 2025-04-09 VITALS
RESPIRATION RATE: 16 BRPM | BODY MASS INDEX: 27.31 KG/M2 | HEIGHT: 64 IN | TEMPERATURE: 98.3 F | HEART RATE: 98 BPM | WEIGHT: 160 LBS | SYSTOLIC BLOOD PRESSURE: 122 MMHG | DIASTOLIC BLOOD PRESSURE: 72 MMHG | OXYGEN SATURATION: 100 %

## 2025-04-09 DIAGNOSIS — T74.91XA DOMESTIC ABUSE OF ADULT, INITIAL ENCOUNTER: Primary | ICD-10-CM

## 2025-04-09 PROCEDURE — 72040 X-RAY EXAM NECK SPINE 2-3 VW: CPT

## 2025-04-09 PROCEDURE — 71101 X-RAY EXAM UNILAT RIBS/CHEST: CPT

## 2025-04-09 PROCEDURE — 73090 X-RAY EXAM OF FOREARM: CPT

## 2025-04-09 PROCEDURE — 99283 EMERGENCY DEPT VISIT LOW MDM: CPT

## 2025-04-09 NOTE — DISCHARGE INSTRUCTIONS
All your x-rays are negative for any fractures or dislocations.  Please take Tylenol for pain as needed.  Follow-up with your OB/GYN.  If any vaginal bleeding please return to the ED
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
## 2025-04-17 ENCOUNTER — HOSPITAL ENCOUNTER (OUTPATIENT)
Facility: HOSPITAL | Age: 25
Discharge: HOME | End: 2025-04-17
Attending: OBSTETRICS & GYNECOLOGY | Admitting: OBSTETRICS & GYNECOLOGY
Payer: MEDICAID

## 2025-04-17 ENCOUNTER — APPOINTMENT (OUTPATIENT)
Dept: ULTRASOUND IMAGING | Facility: HOSPITAL | Age: 25
End: 2025-04-17
Payer: MEDICAID

## 2025-04-17 VITALS — TEMPERATURE: 98.4 F | SYSTOLIC BLOOD PRESSURE: 132 MMHG | DIASTOLIC BLOOD PRESSURE: 79 MMHG | HEART RATE: 111 BPM

## 2025-04-17 PROCEDURE — G0463 HOSPITAL OUTPT CLINIC VISIT: HCPCS

## 2025-04-17 PROCEDURE — 76815 OB US LIMITED FETUS(S): CPT

## 2025-04-17 NOTE — NON STRESS TEST
Obstetrical Non-stress Test Interpretation     Name:  Susan Palacios  MRN: [id number]    24 y.o. female  at 21w2d    Indication:  vaginal bleeding       Fetal Assessment  Fetal Movement: active  Fetal HR Assessment Method: intermittent auscultation, using Doppler  Fetal HR (beats/min): 140    /79 (BP Location: Right arm, Patient Position: Sitting)   Pulse 111   Temp 98.4 °F (36.9 °C)     Reason for test: OB Triage (vaginal bleeding)  Date of Test: 2025  Time frame of test: 7803-6938  RN NST Interpretation:  (appropriate for gestational age)      Chhaya Guillen RN  2025  18:17 EDT
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
## 2025-06-06 ENCOUNTER — HOSPITAL ENCOUNTER (INPATIENT)
Facility: HOSPITAL | Age: 25
LOS: 1 days | Discharge: HOME | DRG: 833 | End: 2025-06-07
Attending: OBSTETRICS & GYNECOLOGY | Admitting: OBSTETRICS & GYNECOLOGY
Payer: MEDICAID

## 2025-06-06 PROBLEM — R10.2 PELVIC CRAMPING IN ANTEPARTUM PERIOD: Status: ACTIVE | Noted: 2025-06-06

## 2025-06-06 PROBLEM — O26.899 PELVIC CRAMPING IN ANTEPARTUM PERIOD: Status: ACTIVE | Noted: 2025-06-06

## 2025-06-06 LAB
A1 MICROGLOB PLACENTAL VAG QL: NEGATIVE
ABO GROUP BLD: NORMAL
ALBUMIN SERPL-MCNC: 3.5 G/DL (ref 3.5–5.2)
ALBUMIN/GLOB SERPL: 1.1 G/DL
ALP SERPL-CCNC: 106 U/L (ref 39–117)
ALT SERPL W P-5'-P-CCNC: 11 U/L (ref 1–33)
ANION GAP SERPL CALCULATED.3IONS-SCNC: 13.2 MMOL/L (ref 5–15)
AST SERPL-CCNC: 23 U/L (ref 1–32)
BACTERIA UR QL AUTO: ABNORMAL /HPF
BILIRUB SERPL-MCNC: 0.2 MG/DL (ref 0–1.2)
BILIRUB UR QL STRIP: NEGATIVE
BLD GP AB SCN SERPL QL: NEGATIVE
BLOODY SPECIMEN?: NO
BUN SERPL-MCNC: 3.9 MG/DL (ref 6–20)
BUN/CREAT SERPL: 8.5 (ref 7–25)
C TRACH DNA SPEC QL NAA+PROBE: NOT DETECTED
CALCIUM SPEC-SCNC: 9 MG/DL (ref 8.6–10.5)
CHLORIDE SERPL-SCNC: 103 MMOL/L (ref 98–107)
CLARITY UR: CLEAR
CO2 SERPL-SCNC: 17.8 MMOL/L (ref 22–29)
COLOR UR: YELLOW
CREAT SERPL-MCNC: 0.46 MG/DL (ref 0.57–1)
CREAT UR-MCNC: 51.6 MG/DL
DEPRECATED RDW RBC AUTO: 39.4 FL (ref 37–54)
DEPRECATED RDW RBC AUTO: 39.8 FL (ref 37–54)
EGFRCR SERPLBLD CKD-EPI 2021: 139 ML/MIN/1.73
ERYTHROCYTE [DISTWIDTH] IN BLOOD BY AUTOMATED COUNT: 12.3 % (ref 12.3–15.4)
ERYTHROCYTE [DISTWIDTH] IN BLOOD BY AUTOMATED COUNT: 12.5 % (ref 12.3–15.4)
FIBRINOGEN PPP-MCNC: 576 MG/DL (ref 215–521)
FIBRINOGEN PPP-MCNC: 611 MG/DL (ref 215–521)
FIBRONECTIN FETAL VAG QL: POSITIVE
GLOBULIN UR ELPH-MCNC: 3.3 GM/DL
GLUCOSE SERPL-MCNC: 87 MG/DL (ref 65–99)
GLUCOSE UR STRIP-MCNC: NEGATIVE MG/DL
HCT VFR BLD AUTO: 34.1 % (ref 34–46.6)
HCT VFR BLD AUTO: 34.6 % (ref 34–46.6)
HGB BLD-MCNC: 11.7 G/DL (ref 12–15.9)
HGB BLD-MCNC: 11.9 G/DL (ref 12–15.9)
HGB UR QL STRIP.AUTO: ABNORMAL
HOLD SPECIMEN: NORMAL
HYALINE CASTS UR QL AUTO: ABNORMAL /LPF
KETONES UR QL STRIP: NEGATIVE
LDH SERPL-CCNC: 112 U/L (ref 135–214)
LEUKOCYTE ESTERASE UR QL STRIP.AUTO: NEGATIVE
MCH RBC QN AUTO: 30.1 PG (ref 26.6–33)
MCH RBC QN AUTO: 30.1 PG (ref 26.6–33)
MCHC RBC AUTO-ENTMCNC: 34.3 G/DL (ref 31.5–35.7)
MCHC RBC AUTO-ENTMCNC: 34.4 G/DL (ref 31.5–35.7)
MCV RBC AUTO: 87.4 FL (ref 79–97)
MCV RBC AUTO: 87.7 FL (ref 79–97)
N GONORRHOEA RRNA SPEC QL NAA+PROBE: NOT DETECTED
NITRITE UR QL STRIP: NEGATIVE
PH UR STRIP.AUTO: 7 [PH] (ref 5–8)
PLATELET # BLD AUTO: 369 10*3/MM3 (ref 140–450)
PLATELET # BLD AUTO: 372 10*3/MM3 (ref 140–450)
PMV BLD AUTO: 10 FL (ref 6–12)
PMV BLD AUTO: 10 FL (ref 6–12)
POTASSIUM SERPL-SCNC: 4 MMOL/L (ref 3.5–5.2)
PROT ?TM UR-MCNC: 7.3 MG/DL
PROT SERPL-MCNC: 6.8 G/DL (ref 6–8.5)
PROT UR QL STRIP: NEGATIVE
PROT/CREAT UR: 0.14 MG/G{CREAT}
RBC # BLD AUTO: 3.89 10*6/MM3 (ref 3.77–5.28)
RBC # BLD AUTO: 3.96 10*6/MM3 (ref 3.77–5.28)
RBC # UR STRIP: ABNORMAL /HPF
REF LAB TEST METHOD: ABNORMAL
RH BLD: POSITIVE
SODIUM SERPL-SCNC: 134 MMOL/L (ref 136–145)
SP GR UR STRIP: 1.01 (ref 1–1.03)
SQUAMOUS #/AREA URNS HPF: ABNORMAL /HPF
T&S EXPIRATION DATE: NORMAL
TREPONEMA PALLIDUM IGG+IGM AB [PRESENCE] IN SERUM OR PLASMA BY IMMUNOASSAY: NORMAL
URATE SERPL-MCNC: 2.5 MG/DL (ref 2.4–5.7)
UROBILINOGEN UR QL STRIP: ABNORMAL
WBC # UR STRIP: ABNORMAL /HPF
WBC NRBC COR # BLD AUTO: 16.25 10*3/MM3 (ref 3.4–10.8)
WBC NRBC COR # BLD AUTO: 17 10*3/MM3 (ref 3.4–10.8)
WHOLE BLOOD HOLD COAG: NORMAL
WHOLE BLOOD HOLD SPECIMEN: NORMAL

## 2025-06-06 PROCEDURE — 84112 EVAL AMNIOTIC FLUID PROTEIN: CPT | Performed by: OBSTETRICS & GYNECOLOGY

## 2025-06-06 PROCEDURE — 86780 TREPONEMA PALLIDUM: CPT | Performed by: OBSTETRICS & GYNECOLOGY

## 2025-06-06 PROCEDURE — 86850 RBC ANTIBODY SCREEN: CPT | Performed by: OBSTETRICS & GYNECOLOGY

## 2025-06-06 PROCEDURE — 86901 BLOOD TYPING SEROLOGIC RH(D): CPT | Performed by: OBSTETRICS & GYNECOLOGY

## 2025-06-06 PROCEDURE — 85027 COMPLETE CBC AUTOMATED: CPT | Performed by: OBSTETRICS & GYNECOLOGY

## 2025-06-06 PROCEDURE — 82731 ASSAY OF FETAL FIBRONECTIN: CPT | Performed by: OBSTETRICS & GYNECOLOGY

## 2025-06-06 PROCEDURE — 96372 THER/PROPH/DIAG INJ SC/IM: CPT

## 2025-06-06 PROCEDURE — 81001 URINALYSIS AUTO W/SCOPE: CPT | Performed by: OBSTETRICS & GYNECOLOGY

## 2025-06-06 PROCEDURE — 87591 N.GONORRHOEAE DNA AMP PROB: CPT | Performed by: OBSTETRICS & GYNECOLOGY

## 2025-06-06 PROCEDURE — 25810000003 LACTATED RINGERS PER 1000 ML: Performed by: OBSTETRICS & GYNECOLOGY

## 2025-06-06 PROCEDURE — P9612 CATHETERIZE FOR URINE SPEC: HCPCS

## 2025-06-06 PROCEDURE — 85384 FIBRINOGEN ACTIVITY: CPT | Performed by: OBSTETRICS & GYNECOLOGY

## 2025-06-06 PROCEDURE — 82570 ASSAY OF URINE CREATININE: CPT | Performed by: OBSTETRICS & GYNECOLOGY

## 2025-06-06 PROCEDURE — 84156 ASSAY OF PROTEIN URINE: CPT | Performed by: OBSTETRICS & GYNECOLOGY

## 2025-06-06 PROCEDURE — 83615 LACTATE (LD) (LDH) ENZYME: CPT | Performed by: OBSTETRICS & GYNECOLOGY

## 2025-06-06 PROCEDURE — 87086 URINE CULTURE/COLONY COUNT: CPT | Performed by: OBSTETRICS & GYNECOLOGY

## 2025-06-06 PROCEDURE — G0463 HOSPITAL OUTPT CLINIC VISIT: HCPCS

## 2025-06-06 PROCEDURE — 84550 ASSAY OF BLOOD/URIC ACID: CPT | Performed by: OBSTETRICS & GYNECOLOGY

## 2025-06-06 PROCEDURE — 87491 CHLMYD TRACH DNA AMP PROBE: CPT | Performed by: OBSTETRICS & GYNECOLOGY

## 2025-06-06 PROCEDURE — 80053 COMPREHEN METABOLIC PANEL: CPT | Performed by: OBSTETRICS & GYNECOLOGY

## 2025-06-06 PROCEDURE — 86900 BLOOD TYPING SEROLOGIC ABO: CPT | Performed by: OBSTETRICS & GYNECOLOGY

## 2025-06-06 PROCEDURE — 25010000002 BETAMETHASONE ACET & SOD PHOS PER 4 MG: Performed by: OBSTETRICS & GYNECOLOGY

## 2025-06-06 PROCEDURE — 59025 FETAL NON-STRESS TEST: CPT

## 2025-06-06 RX ORDER — ONDANSETRON 2 MG/ML
4 INJECTION INTRAMUSCULAR; INTRAVENOUS EVERY 8 HOURS PRN
Status: DISCONTINUED | OUTPATIENT
Start: 2025-06-06 | End: 2025-06-07 | Stop reason: HOSPADM

## 2025-06-06 RX ORDER — ACETAMINOPHEN 325 MG/1
650 TABLET ORAL EVERY 4 HOURS PRN
Status: DISCONTINUED | OUTPATIENT
Start: 2025-06-06 | End: 2025-06-07 | Stop reason: HOSPADM

## 2025-06-06 RX ORDER — CALCIUM CARBONATE 500 MG/1
1 TABLET, CHEWABLE ORAL DAILY PRN
Status: DISCONTINUED | OUTPATIENT
Start: 2025-06-06 | End: 2025-06-07 | Stop reason: HOSPADM

## 2025-06-06 RX ORDER — SODIUM CHLORIDE 0.9 % (FLUSH) 0.9 %
10 SYRINGE (ML) INJECTION AS NEEDED
Status: DISCONTINUED | OUTPATIENT
Start: 2025-06-06 | End: 2025-06-07 | Stop reason: HOSPADM

## 2025-06-06 RX ORDER — LIDOCAINE HYDROCHLORIDE 10 MG/ML
0.5 INJECTION, SOLUTION EPIDURAL; INFILTRATION; INTRACAUDAL; PERINEURAL ONCE AS NEEDED
Status: DISCONTINUED | OUTPATIENT
Start: 2025-06-06 | End: 2025-06-07 | Stop reason: HOSPADM

## 2025-06-06 RX ORDER — DOCUSATE SODIUM 100 MG/1
100 CAPSULE, LIQUID FILLED ORAL 2 TIMES DAILY PRN
Status: DISCONTINUED | OUTPATIENT
Start: 2025-06-06 | End: 2025-06-07 | Stop reason: HOSPADM

## 2025-06-06 RX ORDER — HYDROXYZINE HYDROCHLORIDE 25 MG/1
50 TABLET, FILM COATED ORAL NIGHTLY PRN
Status: DISCONTINUED | OUTPATIENT
Start: 2025-06-06 | End: 2025-06-07 | Stop reason: HOSPADM

## 2025-06-06 RX ORDER — SODIUM CHLORIDE 9 MG/ML
40 INJECTION, SOLUTION INTRAVENOUS AS NEEDED
Status: DISCONTINUED | OUTPATIENT
Start: 2025-06-06 | End: 2025-06-07 | Stop reason: HOSPADM

## 2025-06-06 RX ORDER — SODIUM CHLORIDE 0.9 % (FLUSH) 0.9 %
10 SYRINGE (ML) INJECTION EVERY 12 HOURS SCHEDULED
Status: DISCONTINUED | OUTPATIENT
Start: 2025-06-06 | End: 2025-06-07 | Stop reason: HOSPADM

## 2025-06-06 RX ORDER — BETAMETHASONE SODIUM PHOSPHATE AND BETAMETHASONE ACETATE 3; 3 MG/ML; MG/ML
12 INJECTION, SUSPENSION INTRA-ARTICULAR; INTRALESIONAL; INTRAMUSCULAR; SOFT TISSUE EVERY 24 HOURS
Status: DISCONTINUED | OUTPATIENT
Start: 2025-06-06 | End: 2025-06-07

## 2025-06-06 RX ORDER — BISACODYL 10 MG
10 SUPPOSITORY, RECTAL RECTAL DAILY PRN
Status: DISCONTINUED | OUTPATIENT
Start: 2025-06-06 | End: 2025-06-07 | Stop reason: HOSPADM

## 2025-06-06 RX ORDER — ONDANSETRON 4 MG/1
8 TABLET, ORALLY DISINTEGRATING ORAL EVERY 8 HOURS PRN
Status: DISCONTINUED | OUTPATIENT
Start: 2025-06-06 | End: 2025-06-07 | Stop reason: HOSPADM

## 2025-06-06 RX ORDER — SODIUM CHLORIDE, SODIUM LACTATE, POTASSIUM CHLORIDE, CALCIUM CHLORIDE 600; 310; 30; 20 MG/100ML; MG/100ML; MG/100ML; MG/100ML
150 INJECTION, SOLUTION INTRAVENOUS CONTINUOUS
Status: ACTIVE | OUTPATIENT
Start: 2025-06-06 | End: 2025-06-07

## 2025-06-06 RX ADMIN — BETAMETHASONE ACETATE AND BETAMETHASONE SODIUM PHOSPHATE 12 MG: 3; 3 INJECTION, SUSPENSION INTRA-ARTICULAR; INTRALESIONAL; INTRAMUSCULAR; SOFT TISSUE at 22:29

## 2025-06-06 RX ADMIN — SODIUM CHLORIDE, POTASSIUM CHLORIDE, SODIUM LACTATE AND CALCIUM CHLORIDE 125 ML/HR: 600; 310; 30; 20 INJECTION, SOLUTION INTRAVENOUS at 22:33

## 2025-06-06 NOTE — NURSING NOTE
Patient arrived to unit with complaint of cramping and vaginal bleeding with small clots. Dr. Winchester at bedside performed sterile speculum exam. Swabs collected and SVE performed by MD at this time. Discussed plan of care with patient. Orders given by Dr. Winchester.

## 2025-06-07 VITALS
TEMPERATURE: 97.8 F | RESPIRATION RATE: 18 BRPM | SYSTOLIC BLOOD PRESSURE: 111 MMHG | OXYGEN SATURATION: 100 % | DIASTOLIC BLOOD PRESSURE: 62 MMHG | HEART RATE: 85 BPM

## 2025-06-07 LAB
BASOPHILS # BLD AUTO: 0.01 10*3/MM3 (ref 0–0.2)
BASOPHILS NFR BLD AUTO: 0.1 % (ref 0–1.5)
DEPRECATED RDW RBC AUTO: 39.3 FL (ref 37–54)
EOSINOPHIL # BLD AUTO: 0 10*3/MM3 (ref 0–0.4)
EOSINOPHIL NFR BLD AUTO: 0 % (ref 0.3–6.2)
ERYTHROCYTE [DISTWIDTH] IN BLOOD BY AUTOMATED COUNT: 12.3 % (ref 12.3–15.4)
FIBRINOGEN PPP-MCNC: 553 MG/DL (ref 215–521)
HCT VFR BLD AUTO: 34.4 % (ref 34–46.6)
HGB BLD-MCNC: 11.6 G/DL (ref 12–15.9)
HOLD SPECIMEN: NORMAL
IMM GRANULOCYTES # BLD AUTO: 0.1 10*3/MM3 (ref 0–0.05)
IMM GRANULOCYTES NFR BLD AUTO: 0.6 % (ref 0–0.5)
LYMPHOCYTES # BLD AUTO: 1.02 10*3/MM3 (ref 0.7–3.1)
LYMPHOCYTES NFR BLD AUTO: 6.2 % (ref 19.6–45.3)
MCH RBC QN AUTO: 29.7 PG (ref 26.6–33)
MCHC RBC AUTO-ENTMCNC: 33.7 G/DL (ref 31.5–35.7)
MCV RBC AUTO: 88.2 FL (ref 79–97)
MONOCYTES # BLD AUTO: 0.22 10*3/MM3 (ref 0.1–0.9)
MONOCYTES NFR BLD AUTO: 1.3 % (ref 5–12)
NEUTROPHILS NFR BLD AUTO: 15.21 10*3/MM3 (ref 1.7–7)
NEUTROPHILS NFR BLD AUTO: 91.8 % (ref 42.7–76)
NRBC BLD AUTO-RTO: 0 /100 WBC (ref 0–0.2)
PLATELET # BLD AUTO: 339 10*3/MM3 (ref 140–450)
PMV BLD AUTO: 9.9 FL (ref 6–12)
RBC # BLD AUTO: 3.9 10*6/MM3 (ref 3.77–5.28)
WBC NRBC COR # BLD AUTO: 16.56 10*3/MM3 (ref 3.4–10.8)

## 2025-06-07 PROCEDURE — 85384 FIBRINOGEN ACTIVITY: CPT | Performed by: OBSTETRICS & GYNECOLOGY

## 2025-06-07 PROCEDURE — 59025 FETAL NON-STRESS TEST: CPT

## 2025-06-07 PROCEDURE — 25010000002 BETAMETHASONE ACET & SOD PHOS PER 4 MG: Performed by: OBSTETRICS & GYNECOLOGY

## 2025-06-07 PROCEDURE — 96372 THER/PROPH/DIAG INJ SC/IM: CPT

## 2025-06-07 PROCEDURE — G0463 HOSPITAL OUTPT CLINIC VISIT: HCPCS

## 2025-06-07 PROCEDURE — 85025 COMPLETE CBC W/AUTO DIFF WBC: CPT | Performed by: OBSTETRICS & GYNECOLOGY

## 2025-06-07 RX ORDER — BETAMETHASONE SODIUM PHOSPHATE AND BETAMETHASONE ACETATE 3; 3 MG/ML; MG/ML
12 INJECTION, SUSPENSION INTRA-ARTICULAR; INTRALESIONAL; INTRAMUSCULAR; SOFT TISSUE
Status: COMPLETED | OUTPATIENT
Start: 2025-06-07 | End: 2025-06-07

## 2025-06-07 RX ADMIN — BETAMETHASONE ACETATE AND BETAMETHASONE SODIUM PHOSPHATE 12 MG: 3; 3 INJECTION, SUSPENSION INTRA-ARTICULAR; INTRALESIONAL; INTRAMUSCULAR; SOFT TISSUE at 18:24

## 2025-06-07 NOTE — NON STRESS TEST
Obstetrical Non-stress Test Interpretation     Name:  Susan Palacios  MRN: [id number]    24 y.o. female  at 28w4d    Indication: OBS admission for vaginal bleeding and cramping      Fetal Assessment  Fetal Movement: active  Fetal HR Assessment Method: external  Fetal HR (beats/min): 130  Fetal HR Baseline: normal range  Fetal HR Variability: moderate (amplitude range 6 to 25 bpm)  Fetal HR Accelerations: greater than/equal to 10 bpm (32 wks gest or less), lasts at least 10 seconds (32 wks gest or less)  Fetal HR Decelerations: absent  Sinusoidal Pattern Present: absent    /58 (BP Location: Right arm, Patient Position: Lying)   Pulse 95   Temp 98.7 °F (37.1 °C) (Oral)   Resp 14     Reason for test: Other (Comment) (OBS admission for vaginal bleeding and cramping)  Date of Test: 2025  Time frame of test:   RN NST Interpretation:  (appropriate for gestational age)      Britta Stevenson RN  2025  06:30 EDT

## 2025-06-07 NOTE — SIGNIFICANT NOTE
Discharge instructions given and explained, verbalizes understanding. Left floor ambulatory with family, no distress noted.

## 2025-06-07 NOTE — NON STRESS TEST
Obstetrical Non-stress Test Interpretation     Name:  Susan Palacios  MRN: [id number]    24 y.o. female  at 28w4d    Indication: obs for cramping/spotting      Fetal Assessment  Fetal Movement: active  Fetal HR Assessment Method: external  Fetal HR (beats/min): 130  Fetal HR Baseline: normal range  Fetal HR Variability: moderate (amplitude range 6 to 25 bpm)  Fetal HR Accelerations: greater than/equal to 10 bpm (32 wks gest or less), lasts at least 10 seconds (32 wks gest or less)  Fetal HR Decelerations: absent  Sinusoidal Pattern Present: absent    /62 (BP Location: Right arm, Patient Position: Lying)   Pulse 85   Temp 97.8 °F (36.6 °C) (Axillary)   Resp 18   SpO2 100%     Reason for test: Other (Comment)  Date of Test: 2025  Time frame of test: 20 minutes  RN NST Interpretation: Reactive      Radha Powell RN  2025  16:50 EDT

## 2025-06-07 NOTE — H&P
TIFFANIE Harrison  Obstetric History and Physical    Chief Complaint   Patient presents with    Abdominal Cramping       Subjective     PNC provided by:  Dr Barraza/ Robe    HPI:    Patient is a 24 y.o. female  currently at 28w3d, who presents with vaginal bleeding.    Her prenatal care is complicated by  Hypertension gestational hypertension vs transient HTN - maybe secondary to her PTSD/ anxiety??states had an isolated BP in the office - was told to obtain a 24 hour urine , was 344mg/dl - on 3/30 . Her labs were stable . Her urine was not analyzed/cultured for UTI despite this and  wbcs of 17K at that time   .   She was given labetalol but does not take it as some of her Bps at home ( On Bedrest ? )  Are super low...   previous obstetric/gynecological history is noted for is non-contributory.    The following portions of the patients history were reviewed and updated as appropriate:   current medications, allergies, past medical history, past surgical history, past family history, past social history and current problem list.     Prenatal Information:  Prenatal Results       Initial Prenatal Labs       Test Value Reference Range Date Time    Hemoglobin  13.3 g/dL 12.0 - 15.9 24 1308    Hematocrit  39.8 % 34.0 - 46.6 24 1308    Platelets  419 10*3/mm3 140 - 450 24 1308    Rubella IgG        Hepatitis B SAg        Hepatitis C Ab        RPR        T. Pallidum Ab   Non-Reactive  Non-Reactive 25    ABO  O   24 1308    Rh  Positive   24 1308    Antibody Screen ^ NEGATIVE   01/15/25 1611      ^ Performed at [facility]. [address] [place] [address]. Dr. Dean Costello (Medical Director).   01/15/25 1611       Negative   24 1308    HIV        Urine Culture        Gonorrhea  Not Detected  Not Detected 25    Chlamydia  Not Detected  Not Detected 25    TSH        HgB A1c         Varicella IgG        Hemoglobinopathy Fractionation         Hemoglobinopathy (genetic testing)        Cystic fibrosis         Spinal muscular atrophy        Fragile X                  Fetal testing        Test Value Reference Range Date Time    NIPT        MSAFP        AFP-4                  2nd and 3rd Trimester       Test Value Reference Range Date Time    Hemoglobin (repeated)  11.9 g/dL 12.0 - 15.9 06/06/25 1900    Hematocrit (repeated)  34.6 % 34.0 - 46.6 06/06/25 1900    Platelets   369 10*3/mm3 140 - 450 06/06/25 1900       419 10*3/mm3 140 - 450 12/31/24 1308    1 hour GTT         Antibody Screen (repeated)        3rd TM syphilis scrn (repeated)  RPR         3rd TM syphilis scrn (repeated) TP-Ab  Non-Reactive  Non-Reactive 06/06/25 1959    3rd TM syphilis screen TB-Ab (FTA)  Non-Reactive  Non-Reactive 06/06/25 1959    Syphilis cascade test TP-Ab (EIA)        Syphilis cascade TPPA        GTT Fasting        GTT 1 Hr        GTT 2 Hr        GTT 3 Hr        Group B Strep                  Other testing        Test Value Reference Range Date Time    Parvo IgG         CMV IgG                   Drug Screening       Test Value Reference Range Date Time    Amphetamine Screen        Barbiturate Screen        Benzodiazepine Screen        Methadone Screen        Phencyclidine Screen        Opiates Screen        THC Screen        Cocaine Screen        Propoxyphene Screen        Buprenorphine Screen        Methamphetamine Screen        Oxycodone Screen        Tricyclic Antidepressants Screen                  Legend    ^: Historical                          External Prenatal Results       Pregnancy Outside Results - Transcribed From Office Records - See Scanned Records For Details       Test Value Date Time    ABO  O  12/31/24 1308    Rh  Positive  12/31/24 1308    Antibody Screen ^ NEGATIVE  01/15/25 1611      ^ Performed at [facility]. [address] [place] [address]. Dr. Dean Costello (Medical Director).  01/15/25 1611       Negative  12/31/24 1308    Varicella IgG        Rubella       Hgb  11.9 g/dL 250       13.3 g/dL 24 1308    Hct  34.6 % 25 1900       39.8 % 24 1308    HgB A1c        1h GTT       3h GTT Fasting       3h GTT 1 hour       3h GTT 2 hour       3h GTT 3 hour        Gonorrhea (discrete)  Not Detected  25    Chlamydia (discrete)  Not Detected  25    RPR       Syphils cascade: TP-Ab (FTA)  Non-Reactive  25    TP-Ab  Non-Reactive  25    TP-Ab (EIA)       TPPA       HBsAg       Herpes Simplex Virus PCR       Herpes Simplex VIrus Culture       HIV       Hep C RNA Quant PCR ^ NONREACTIVE  01/15/25 1611    Hep C Antibody       AFP       NIPT       Cystic Fibrosis (Ari)       Cystic Fibroisis        Spinal Muscular atrophy       Fragile X       Group B Strep       GBS Susceptibility to Clindamycin       GBS Susceptibility to Erythromycin       Fetal Fibronectin  Positive  25 1855    Genetic Testing, Maternal Blood                 Drug Screening       Test Value Date Time    Urine Drug Screen       Amphetamine Screen       Barbiturate Screen       Benzodiazepine Screen       Methadone Screen       Phencyclidine Screen       Opiates Screen       THC Screen       Cocaine Screen       Propoxyphene Screen       Buprenorphine Screen       Methamphetamine Screen       Oxycodone Screen       Tricyclic Antidepressants Screen                 Legend    ^: Historical                             Problem List:     Patient Active Problem List   Diagnosis    Pelvic cramping in antepartum period        Past OB History:     OB History    Para Term  AB Living   1 0 0 0 0 0   SAB IAB Ectopic Molar Multiple Live Births   0 0 0 0 0 0      # Outcome Date GA Lbr Francois/2nd Weight Sex Type Anes PTL Lv   1 Current                Past Medical History: Past Medical History:   Diagnosis Date    Anxiety     Borderline personality disorder     PTSD (post-traumatic stress disorder)     Trauma       Past Surgical  History No past surgical history on file.   Family History: Family History   Problem Relation Age of Onset    Diabetes Mother     Heart failure Mother       Social History:  reports that she has never smoked. She has never used smokeless tobacco.   reports no history of alcohol use.  Drug use questions deferred to the physician.        General ROS: Pertinent items are noted in HPI        Home Medications:  OXcarbazepine    Allergies:  Allergies   Allergen Reactions    Shrimp Anaphylaxis    Tree Nut Anaphylaxis     Peanuts also       Objective       Vital Signs Range for the last 24 hours  Temperature: Temp:  [98.3 °F (36.8 °C)-98.7 °F (37.1 °C)] 98.7 °F (37.1 °C)   Temp Source: Temp src: Oral   BP: BP: (112-139)/(58-92) 115/58   Pulse: Heart Rate:  [] 95   Respirations: Resp:  [14-18] 14   SPO2:       Physical Examination:   General appearance - alert, well appearing, and in no distress  Mental status - alert, oriented to person, place, and time  Chest -.REG rate, NO stridor  Heart - normal rate, .  Abdomen - soft, nontender, nondistended, no palpable ctxs  Pelvic - normal external genitalia, vulva, vagina, cervix, and uterus   Back exam - full range of motion, no tenderness or pain on motion  Neurological - alert, oriented, normal speech  Extremities - peripheral pulses normal  Skin - normal coloration and turgor, no suspicious skin lesions noted    Presentation:  closed   Speculum exam - CERVICAL ECTROPION- bleeding noted from what appears to bet the surface of th cervix    Cervix:     Dilation:     Effacement:     Station:         Fetal Heart Rate Assessment   Method: Fetal HR Assessment Method: external   Beats/min: Fetal HR (beats/min): 135   Baseline: Fetal HR Baseline: normal range   Variability: Fetal HR Variability: moderate (amplitude range 6 to 25 bpm)   Accels: Fetal HR Accelerations: greater than/equal to 10 bpm (32 wks gest or less), lasts at least 10 seconds (32 wks gest or less)   Decels:  Fetal HR Decelerations: absent   Tracing Category:       Uterine Assessment   Method: Method: external tocotransducer   Frequency (min): Contraction Frequency (Minutes): x1   Ctx Count in 10 min:     Duration:     Intensity: Contraction Intensity: no contractions   Sharmila Units:       Lab Results (last 24 hours)       Procedure Component Value Units Date/Time    Rapid Assay For ROM - Amniotic Fluid, Amniotic Sac [[id number]]  (Normal) Collected: 06/06/25 1855    Specimen: Amniotic Fluid from Amniotic Sac Updated: 06/06/25 1947     Rupture of Membranes Negative    Narrative:      This test detects tiny amounts of amniotic fluid and is  approved for use at any gestational age. When there is   significant presence of blood on the swab, the test can   malfunction and is not recommended (possible false positive  results). In cases of only trace amounts of blood on the swab,  the test still functions properly and will not interfere with  the test results. In very rare cases when a sample is taken  12 hours or later after a rupture, a false negative result may  occur due to obstruction of the rupture by fetus or resealing  of the amniotic sac.    Fetal Fibronectin - Vaginal Fluid, Cervix [[id number]]  (Abnormal) Collected: 06/06/25 1855    Specimen: Vaginal Fluid from Cervix Updated: 06/06/25 1938     Fetal Fibronectin Positive     Bloody Specimen? no    Narrative:      INTERPRETATION                 Negative: <50 ng/mL  Positive: >or= 50 ng/mL  Invalid: Possible sample integrity compromised. Test was           repeated.  -----------------------------------------------------------   LIMITATIONS  A fFN Sample SHOULD NOT be sent to the lab if any of the  following conditions are present in symptomatic patients:  -Advanced Cervical Dilation (>3 cm)                         -Rupture of Amniotic Membranes  -Moderate or Gross Vaginal Bleeding (May Cause False Positive)      Urinalysis With Culture If Indicated - Straight Cath  [[id number]]  (Abnormal) Collected: 06/06/25 1856    Specimen: Urine from Straight Cath Updated: 06/06/25 1958     Color, UA Yellow     Appearance, UA Clear     pH, UA 7.0     Specific Gravity, UA 1.008     Glucose, UA Negative     Ketones, UA Negative     Bilirubin, UA Negative     Blood, UA Trace     Protein, UA Negative     Leuk Esterase, UA Negative     Nitrite, UA Negative     Urobilinogen, UA 0.2 E.U./dL    Narrative:      In absence of clinical symptoms, the presence of pyuria, bacteria, and/or nitrites on the urinalysis result does not correlate with infection.    Protein / Creatinine Ratio, Urine - Straight Cath [[id number]] Collected: 06/06/25 1856    Specimen: Urine from Straight Cath Updated: 06/06/25 1933     Creatinine, Urine 51.6 mg/dL      Total Protein, Urine 7.3 mg/dL      Protein/Creatinine Ratio, Urine 0.14    Urinalysis, Microscopic Only - Straight Cath [[id number]]  (Abnormal) Collected: 06/06/25 1856    Specimen: Urine from Straight Cath Updated: 06/06/25 1958     RBC, UA 3-5 /HPF      WBC, UA 0-2 /HPF      Bacteria, UA None Seen /HPF      Squamous Epithelial Cells, UA None Seen /HPF      Hyaline Casts, UA None Seen /LPF      Methodology Manual Light Microscopy    Urine Culture - Urine, Straight Cath [[id number]] Collected: 06/06/25 1856    Specimen: Urine from Straight Cath Updated: 06/06/25 1918    CBC (No Diff) [[id number]]  (Abnormal) Collected: 06/06/25 1900    Specimen: Blood Updated: 06/06/25 1914     WBC 16.25 10*3/mm3      RBC 3.96 10*6/mm3      Hemoglobin 11.9 g/dL      Hematocrit 34.6 %      MCV 87.4 fL      MCH 30.1 pg      MCHC 34.4 g/dL      RDW 12.3 %      RDW-SD 39.4 fl      MPV 10.0 fL      Platelets 369 10*3/mm3     Comprehensive Metabolic Panel [[id number]]  (Abnormal) Collected: 06/06/25 1900    Specimen: Blood Updated: 06/06/25 2010     Glucose 87 mg/dL      BUN 3.9 mg/dL      Creatinine 0.46 mg/dL      Sodium 134 mmol/L      Potassium 4.0 mmol/L      Comment: Specimen  hemolyzed.  Result may be falsely elevated.        Chloride 103 mmol/L      CO2 17.8 mmol/L      Calcium 9.0 mg/dL      Total Protein 6.8 g/dL      Albumin 3.5 g/dL      ALT (SGPT) 11 U/L      Comment: Specimen hemolyzed.  Result may  be falsely elevated.        AST (SGOT) 23 U/L      Comment: Specimen hemolyzed.  Result may be falsely elevated.        Alkaline Phosphatase 106 U/L      Total Bilirubin 0.2 mg/dL      Globulin 3.3 gm/dL      A/G Ratio 1.1 g/dL      BUN/Creatinine Ratio 8.5     Anion Gap 13.2 mmol/L      eGFR 139.0 mL/min/1.73     Narrative:      GFR Categories in Chronic Kidney Disease (CKD)              GFR Category          GFR (mL/min/1.73)    Interpretation  G1                    90 or greater        Normal or high (1)  G2                    60-89                Mild decrease (1)  G3a                   45-59                Mild to moderate decrease  G3b                   30-44                Moderate to severe decrease  G4                    15-29                Severe decrease  G5                    14 or less           Kidney failure    (1)In the absence of evidence of kidney disease, neither GFR category G1 or G2 fulfill the criteria for CKD.    eGFR calculation 2021 CKD-EPI creatinine equation, which does not include race as a factor    Uric Acid [[id number]]  (Normal) Collected: 06/06/25 1900    Specimen: Blood Updated: 06/06/25 1936     Uric Acid 2.5 mg/dL     Fibrinogen [[id number]]  (Abnormal) Collected: 06/06/25 1959    Specimen: Blood from Arm, Left Updated: 06/06/25 2021     Fibrinogen 611 mg/dL     Lactate Dehydrogenase [[id number]]  (Abnormal) Collected: 06/06/25 1959    Specimen: Blood from Arm, Left Updated: 06/06/25 2030      U/L     Treponema pallidum AB w/Reflex RPR [[id number]]  (Normal) Collected: 06/06/25 1959    Specimen: Blood from Arm, Left Updated: 06/06/25 2243     Treponemal AB Total Non-Reactive    Narrative:      Reactive results will reflex RPR testing.     "Chlamydia trachomatis, Neisseria gonorrhoeae, PCR - Urine, Urine, Random Void [[id number]]  (Normal) Collected: 06/06/25 2003    Specimen: Urine, Random Void Updated: 06/06/25 2201     Chlamydia by PCR Not Detected     Neisseria gonorrhoeae by PCR Not Detected    CBC (No Diff) [[id number]]  (Abnormal) Collected: 06/06/25 2242    Specimen: Blood Updated: 06/06/25 2252     WBC 17.00 10*3/mm3      RBC 3.89 10*6/mm3      Hemoglobin 11.7 g/dL      Hematocrit 34.1 %      MCV 87.7 fL      MCH 30.1 pg      MCHC 34.3 g/dL      RDW 12.5 %      RDW-SD 39.8 fl      MPV 10.0 fL      Platelets 372 10*3/mm3     Fibrinogen [[id number]]  (Abnormal) Collected: 06/06/25 2246    Specimen: Blood Updated: 06/06/25 2305     Fibrinogen 576 mg/dL              GBS is unknown     Assessment & Plan       Pelvic cramping in antepartum period        Assessment:  1.  Intrauterine pregnancy at 28w3d gestation with reactive fetal status.    2. Threatened  PTL  3.  Obstetrical history significant for \"SEVERE PREECLAMPSIA ?? \".--DOUBTFUL!!!  4.  GBS status: No results found for: \"STREPGPB\"    Plan:  1. other:  Admitted for observation , iv hydration , steroids .  Repeat labs.  2.  Plan of care has been reviewed with patient and patient agrees.   3.  Risks, benefits of treatment plan have been discussed.  4.  All questions have been answered.        Electronically signed by Kelly Winchester MD, 06/06/25, 10:45 PM EDT.      "

## 2025-06-07 NOTE — DISCHARGE SUMMARY
Date of admission: 2025    Admission diagnosis: IUP at 28 weeks with spotting and cramping.    Admitting physician: Kelly Winchester MD    Discharge date: 2025    Discharge diagnosis: Same    Discharge physician: Yevgeniy Simpson MD    Discharge condition: Good    Disposition: Discharge home    Hospital course and discharge exam:  Patient is a 24-year-old  1 para 0 female at 28 weeks and 4 days estimate gestational age presented to labor and delivery yesterday with complaint of vaginal bleeding and cramping.  Sterile speculum exam was performed and showed that the source of bleeding was the cervical ectropion.  Fetal fibronectin test was positive.  She was admitted with possible  labor.  Placedo did not show any obvious contractions pattern.  She did receive 1 round of steroids.  She had no vaginal bleeding while on labor and delivery.  She reports feeling better.  She declined repeat cervical exam.    At the time of discharge:  She is afebrile vital signs stable  Lungs are clear to auscultation bilaterally  Heart is regular rhythm  Abdomen soft, nontender, gravid    Patient will be discharged home.  She is evan follow-up with her obstetrician at her next scheduled appointment.  She is going to follow-up sooner for recurrence of bleeding, decreased fetal movements or persistent  contractions.

## 2025-06-07 NOTE — NURSING NOTE
Pt admitted for observation status to receive Betamethasone injections and further monitoring. Pt reports improvement in cramping but still present. VO from Dr. Winchester to continue with TOCO monitoring only, NST to be performed PRN and in AM, CBC and Fibrinogen at 0600 and to obtain Vitals Q4hrs.  Pt has 20 g IV in L hand that is infusing LR at 125 ml/hr. To saline lock once fluids complete.  Pt's mother present and supportive at b/s. Regular diet approved.  Bleeding absent from toilet paper when patient voids.  Abdomen remains soft and nontender to palpation.  Call light within reach.

## 2025-06-08 LAB — BACTERIA SPEC AEROBE CULT: NO GROWTH

## 2025-07-02 ENCOUNTER — TELEPHONE (OUTPATIENT)
Dept: OBSTETRICS AND GYNECOLOGY | Age: 25
End: 2025-07-02
Payer: MEDICAID

## 2025-07-02 ENCOUNTER — HOSPITAL ENCOUNTER (OUTPATIENT)
Facility: HOSPITAL | Age: 25
Discharge: HOME | End: 2025-07-02
Attending: OBSTETRICS & GYNECOLOGY | Admitting: OBSTETRICS & GYNECOLOGY
Payer: MEDICAID

## 2025-07-02 VITALS
HEIGHT: 65 IN | SYSTOLIC BLOOD PRESSURE: 116 MMHG | WEIGHT: 185 LBS | BODY MASS INDEX: 30.82 KG/M2 | OXYGEN SATURATION: 98 % | DIASTOLIC BLOOD PRESSURE: 73 MMHG | HEART RATE: 91 BPM

## 2025-07-02 LAB
ALBUMIN SERPL-MCNC: 3.7 G/DL (ref 3.5–5.2)
ALBUMIN/GLOB SERPL: 1.2 G/DL
ALP SERPL-CCNC: 136 U/L (ref 39–117)
ALT SERPL W P-5'-P-CCNC: 11 U/L (ref 1–33)
ANION GAP SERPL CALCULATED.3IONS-SCNC: 16.4 MMOL/L (ref 5–15)
AST SERPL-CCNC: 14 U/L (ref 1–32)
BILIRUB SERPL-MCNC: 0.2 MG/DL (ref 0–1.2)
BUN SERPL-MCNC: 3.2 MG/DL (ref 6–20)
BUN/CREAT SERPL: 6.8 (ref 7–25)
CALCIUM SPEC-SCNC: 8.6 MG/DL (ref 8.6–10.5)
CHLORIDE SERPL-SCNC: 102 MMOL/L (ref 98–107)
CO2 SERPL-SCNC: 16.6 MMOL/L (ref 22–29)
CREAT SERPL-MCNC: 0.47 MG/DL (ref 0.57–1)
CREAT UR-MCNC: 98.8 MG/DL
DEPRECATED RDW RBC AUTO: 37.2 FL (ref 37–54)
EGFRCR SERPLBLD CKD-EPI 2021: 138.2 ML/MIN/1.73
ERYTHROCYTE [DISTWIDTH] IN BLOOD BY AUTOMATED COUNT: 12 % (ref 12.3–15.4)
GLOBULIN UR ELPH-MCNC: 3.2 GM/DL
GLUCOSE SERPL-MCNC: 103 MG/DL (ref 65–99)
HCT VFR BLD AUTO: 34.1 % (ref 34–46.6)
HGB BLD-MCNC: 11.9 G/DL (ref 12–15.9)
MCH RBC QN AUTO: 29.7 PG (ref 26.6–33)
MCHC RBC AUTO-ENTMCNC: 34.9 G/DL (ref 31.5–35.7)
MCV RBC AUTO: 85 FL (ref 79–97)
PLATELET # BLD AUTO: 382 10*3/MM3 (ref 140–450)
PMV BLD AUTO: 9.8 FL (ref 6–12)
POTASSIUM SERPL-SCNC: 3.6 MMOL/L (ref 3.5–5.2)
PROT ?TM UR-MCNC: 16.1 MG/DL
PROT SERPL-MCNC: 6.9 G/DL (ref 6–8.5)
PROT/CREAT UR: 0.16 MG/G{CREAT}
RBC # BLD AUTO: 4.01 10*6/MM3 (ref 3.77–5.28)
SODIUM SERPL-SCNC: 135 MMOL/L (ref 136–145)
WBC NRBC COR # BLD AUTO: 14.18 10*3/MM3 (ref 3.4–10.8)

## 2025-07-02 PROCEDURE — G0463 HOSPITAL OUTPT CLINIC VISIT: HCPCS

## 2025-07-02 PROCEDURE — 85027 COMPLETE CBC AUTOMATED: CPT | Performed by: OBSTETRICS & GYNECOLOGY

## 2025-07-02 PROCEDURE — 84156 ASSAY OF PROTEIN URINE: CPT | Performed by: OBSTETRICS & GYNECOLOGY

## 2025-07-02 PROCEDURE — 59025 FETAL NON-STRESS TEST: CPT

## 2025-07-02 PROCEDURE — 80053 COMPREHEN METABOLIC PANEL: CPT | Performed by: OBSTETRICS & GYNECOLOGY

## 2025-07-02 PROCEDURE — 82570 ASSAY OF URINE CREATININE: CPT | Performed by: OBSTETRICS & GYNECOLOGY

## 2025-07-02 NOTE — H&P
TIFFANIE Harrison  Obstetric History and Physical    Chief Complaint   Patient presents with    Dizziness    Leg Swelling    Headache       Subjective     HPI:    Patient is a 24 y.o. female  currently at 32w1d, who presents with ocular headache behind both eyes (which she gets fairly frequent).  She states the headache lasted about 25 minutes then resolved.  She also complains of increased pressure in lower abdomen.  She sees Dr Johnson Barraza for this pregnancy but is in [place] visiting her sister.  She denies nausea, vomiting, visual disturbances, epigastric pain, abdominal pain or contractions.  She reports good fetal movement.       [place]  [facility] [place] is complicated by   Patient Active Problem List   Diagnosis    Pelvic cramping in antepartum period         Her previous obstetric/gynecological history is noted for .    The following portions of the patients history were reviewed and updated as appropriate:   current medications, allergies, past medical history, past surgical history, past family history, past social history and current problem list.     Prenatal Information:  Prenatal Results       Initial Prenatal Labs       Test Value Reference Range Date Time    Hemoglobin  13.3 g/dL 12.0 - 15.9 24 1308    Hematocrit  39.8 % 34.0 - 46.6 24 1308    Platelets  419 10*3/mm3 140 - 450 24 1308    Rubella IgG        Hepatitis B SAg        Hepatitis C Ab        RPR        T. Pallidum Ab   Non-Reactive  Non-Reactive 25    ABO  O   25 2242    Rh  Positive   25 2242    Antibody Screen ^ NEGATIVE   01/15/25 1611      ^ Performed at [facility]. [address] [place] [address]. Dr. Dean Costello (Medical Director).   01/15/25 1611       Negative   24 1308    HIV        Urine Culture  No growth   25 1856    Gonorrhea  Not Detected  Not Detected 25    Chlamydia  Not Detected  Not Detected 25    TSH        HgB A1c          Varicella IgG        Hemoglobinopathy Fractionation        Hemoglobinopathy (genetic testing)        Cystic fibrosis         Spinal muscular atrophy        Fragile X                  Fetal testing        Test Value Reference Range Date Time    NIPT        MSAFP        AFP-4                  2nd and 3rd Trimester       Test Value Reference Range Date Time    Hemoglobin (repeated)  11.9 g/dL 12.0 - 15.9 07/02/25 1141       11.6 g/dL 12.0 - 15.9 06/07/25 0522       11.7 g/dL 12.0 - 15.9 06/06/25 2242       11.9 g/dL 12.0 - 15.9 06/06/25 1900    Hematocrit (repeated)  34.1 % 34.0 - 46.6 07/02/25 1141       34.4 % 34.0 - 46.6 06/07/25 0522       34.1 % 34.0 - 46.6 06/06/25 2242       34.6 % 34.0 - 46.6 06/06/25 1900    Platelets   382 10*3/mm3 140 - 450 07/02/25 1141       339 10*3/mm3 140 - 450 06/07/25 0522       372 10*3/mm3 140 - 450 06/06/25 2242       369 10*3/mm3 140 - 450 06/06/25 1900       419 10*3/mm3 140 - 450 12/31/24 1308    1 hour GTT         Antibody Screen (repeated)  Negative   06/06/25 2242    3rd TM syphilis scrn (repeated)  RPR         3rd TM syphilis scrn (repeated) TP-Ab  Non-Reactive  Non-Reactive 06/06/25 1959    3rd TM syphilis screen TB-Ab (FTA)  Non-Reactive  Non-Reactive 06/06/25 1959    Syphilis cascade test TP-Ab (EIA)        Syphilis cascade TPPA        GTT Fasting        GTT 1 Hr        GTT 2 Hr        GTT 3 Hr        Group B Strep                  Other testing        Test Value Reference Range Date Time    Parvo IgG         CMV IgG                   Drug Screening       Test Value Reference Range Date Time    Amphetamine Screen        Barbiturate Screen        Benzodiazepine Screen        Methadone Screen        Phencyclidine Screen        Opiates Screen        THC Screen        Cocaine Screen        Propoxyphene Screen        Buprenorphine Screen        Methamphetamine Screen        Oxycodone Screen        Tricyclic Antidepressants Screen                  Legend    ^:  Historical                          External Prenatal Results       Pregnancy Outside Results - Transcribed From Office Records - See Scanned Records For Details       Test Value Date Time    ABO  O  06/06/25 2242    Rh  Positive  06/06/25 2242    Antibody Screen  Negative  06/06/25 2242      ^ NEGATIVE  01/15/25 1611      ^ Performed at [facility]. [address] [place] [address]. Dr. Dean Costello (Medical Director).  01/15/25 1611       Negative  12/31/24 1308    Varicella IgG       Rubella       Hgb  11.9 g/dL 07/02/25 1141       11.6 g/dL 06/07/25 0522       11.7 g/dL 06/06/25 2242       11.9 g/dL 06/06/25 1900       13.3 g/dL 12/31/24 1308    Hct  34.1 % 07/02/25 1141       34.4 % 06/07/25 0522       34.1 % 06/06/25 2242       34.6 % 06/06/25 1900       39.8 % 12/31/24 1308    HgB A1c        1h GTT       3h GTT Fasting       3h GTT 1 hour       3h GTT 2 hour       3h GTT 3 hour        Gonorrhea (discrete)  Not Detected  06/06/25 2003    Chlamydia (discrete)  Not Detected  06/06/25 2003    RPR       Syphils cascade: TP-Ab (FTA)  Non-Reactive  06/06/25 1959    TP-Ab  Non-Reactive  06/06/25 1959    TP-Ab (EIA)       TPPA       HBsAg       Herpes Simplex Virus PCR       Herpes Simplex VIrus Culture       HIV       Hep C RNA Quant PCR ^ NONREACTIVE  01/15/25 1611    Hep C Antibody       AFP       NIPT       Cystic Fibrosis (Ari)       Cystic Fibroisis        Spinal Muscular atrophy       Fragile X       Group B Strep       GBS Susceptibility to Clindamycin       GBS Susceptibility to Erythromycin       Fetal Fibronectin  Positive  06/06/25 1855    Genetic Testing, Maternal Blood                 Drug Screening       Test Value Date Time    Urine Drug Screen       Amphetamine Screen       Barbiturate Screen       Benzodiazepine Screen       Methadone Screen       Phencyclidine Screen       Opiates Screen       THC Screen       Cocaine Screen       Propoxyphene Screen       Buprenorphine Screen        "Methamphetamine Screen       Oxycodone Screen       Tricyclic Antidepressants Screen                 Legend    ^: Historical                             Past OB History:     OB History    Para Term  AB Living   1 0 0 0 0 0   SAB IAB Ectopic Molar Multiple Live Births   0 0 0 0 0 0      # Outcome Date GA Lbr Francois/2nd Weight Sex Type Anes PTL Lv   1 Current                Past Medical History: Past Medical History:   Diagnosis Date    Anxiety     Borderline personality disorder     PTSD (post-traumatic stress disorder)     Trauma       Past Surgical History No past surgical history on file.   Family History: Family History   Problem Relation Age of Onset    Diabetes Mother     Heart failure Mother       Social History:  reports that she has never smoked. She has never used smokeless tobacco.   reports no history of alcohol use.  Drug use questions deferred to the physician.        General ROS: Pertinent items are noted in HPI    Home Medications:  OXcarbazepine    Allergies:  Allergies   Allergen Reactions    Shrimp Anaphylaxis    Tree Nut Anaphylaxis     Peanuts also       Objective       Vital Signs Range for the last 24 hours  Temperature:     Temp Source:     BP: BP: (116-127)/(73-83) 116/73   Pulse: Heart Rate:  [] 91   Respirations:     SPO2: SpO2:  [98 %] 98 %      Vitals:    25 1139 25 1145 25 1200 25 1230   BP:  127/81 126/83 116/73   Pulse:  104 96 91   SpO2:  98%     Weight: 83.9 kg (185 lb)      Height: 165.1 cm (65\")            Physical Examination:   General appearance - alert, well appearing, and in no distress  Mental status - alert, oriented to person, place, and time  Chest - normal respiratory effort, no distress noted  Heart - normal rate, regular rhythm  Abdomen - soft, nontender, nondistended,   Pelvic - normal external genitalia, vulva, vagina, cervix, and uterus   Back exam - full range of motion, no tenderness or pain on motion  Neurological - alert, " oriented, normal speech  Extremities - peripheral pulses normal  Skin - normal coloration and turgor, no suspicious skin lesions noted    Presentation: Cephalic   Cervix:     Dilation:     Effacement:     Station:         Fetal Heart Rate Assessment   Method: Fetal HR Assessment Method: external   Beats/min: Fetal HR (beats/min): 135   Baseline: Fetal HR Baseline: normal range   Variability: Fetal HR Variability: moderate (amplitude range 6 to 25 bpm)   Accels: Fetal HR Accelerations: greater than/equal to 15 bpm, lasting at least 15 seconds   Decels: Fetal HR Decelerations: absent   Tracing Category:       Uterine Assessment   Method: Method: external tocotransducer   Frequency (min):     Ctx Count in 10 min:     Duration:     Intensity: Contraction Intensity: no contractions   [place] Units:       GBS is unknown     Results for orders placed or performed during the hospital encounter of 07/02/25   CBC (No Diff)    Collection Time: 07/02/25 11:41 AM    Specimen: Blood   Result Value Ref Range    WBC 14.18 (H) 3.40 - 10.80 10*3/mm3    RBC 4.01 3.77 - 5.28 10*6/mm3    Hemoglobin 11.9 (L) 12.0 - 15.9 g/dL    Hematocrit 34.1 34.0 - 46.6 %    MCV 85.0 79.0 - 97.0 fL    MCH 29.7 26.6 - 33.0 pg    MCHC 34.9 31.5 - 35.7 g/dL    RDW 12.0 (L) 12.3 - 15.4 %    RDW-SD 37.2 37.0 - 54.0 fl    MPV 9.8 6.0 - 12.0 fL    Platelets 382 140 - 450 10*3/mm3   Comprehensive Metabolic Panel    Collection Time: 07/02/25 11:41 AM    Specimen: Blood   Result Value Ref Range    Glucose 103 (H) 65 - 99 mg/dL    BUN 3.2 (L) 6.0 - 20.0 mg/dL    Creatinine 0.47 (L) 0.57 - 1.00 mg/dL    Sodium 135 (L) 136 - 145 mmol/L    Potassium 3.6 3.5 - 5.2 mmol/L    Chloride 102 98 - 107 mmol/L    CO2 16.6 (L) 22.0 - 29.0 mmol/L    Calcium 8.6 8.6 - 10.5 mg/dL    Total Protein 6.9 6.0 - 8.5 g/dL    Albumin 3.7 3.5 - 5.2 g/dL    ALT (SGPT) 11 1 - 33 U/L    AST (SGOT) 14 1 - 32 U/L    Alkaline Phosphatase 136 (H) 39 - 117 U/L    Total Bilirubin 0.2 0.0 - 1.2  "mg/dL    Globulin 3.2 gm/dL    A/G Ratio 1.2 g/dL    BUN/Creatinine Ratio 6.8 (L) 7.0 - 25.0    Anion Gap 16.4 (H) 5.0 - 15.0 mmol/L    eGFR 138.2 >60.0 mL/min/1.73   Protein / Creatinine Ratio, Urine - Urine, Clean Catch    Collection Time: 07/02/25 11:41 AM    Specimen: Urine, Clean Catch   Result Value Ref Range    Creatinine, Urine 98.8 mg/dL    Total Protein, Urine 16.1 mg/dL    Protein/Creatinine Ratio, Urine 0.16         Assessment & Plan       * No active hospital problems. *        Assessment:  1.  Intrauterine pregnancy at 32w1d gestation with reactive, reassuring fetal status.    2.  Elevated Blood pressure and ocular headache  3.  Serial Blood Pressures  (occasional borderline reading)  4.  Pre-Eclampsia labs drawn  5.  Obstetrical history significant for is non-contributory.  6.  GBS status: No results found for: \"STREPGPB\"    Plan:  1.  CBC, CMP and urine PC Ratio  2.  Labs reviewed no evidence of PreEclampsia  3.  Plan of care has been reviewed with patient and patient agrees.   4.  Risks, benefits of treatment plan have been discussed.  5.  All questions have been answered.  6.  Follow up in the office with Dr Barraza on 7/9/25.    Annia Sweet MD    Electronically signed by Annia Sweet MD, 07/02/25, 1:14 PM EDT.     "

## 2025-07-02 NOTE — TELEPHONE ENCOUNTER
Received call from patient wanting to transfer care from [facility]. Advised patient we are not accepting any transfers of care at this time from local facilities. No appointment made.

## 2025-07-02 NOTE — NON STRESS TEST
"Obstetrical Non-stress Test Interpretation     Name:  Susan Palacios  MRN: [id number]    24 y.o. female  at 32w1d    Indication: dizzy,headache,swelling,      Fetal Assessment  Fetal Movement: active  Fetal HR Assessment Method: external  Fetal HR (beats/min): 135  Fetal HR Baseline: normal range  Fetal HR Variability: moderate (amplitude range 6 to 25 bpm)  Fetal HR Accelerations: greater than/equal to 15 bpm, lasting at least 15 seconds  Fetal HR Decelerations: absent    /73   Pulse 91   Ht 165.1 cm (65\")   Wt 83.9 kg (185 lb)   SpO2 98%   BMI 30.79 kg/m²   Patient Vitals for the past 24 hrs:   BP Pulse SpO2 Height Weight   25 1230 116/73 91 -- -- --   25 1200 126/83 96 -- -- --   25 1145 127/81 104 98 % -- --   25 1139 -- -- -- 165.1 cm (65\") 83.9 kg (185 lb)        Reason for test: OB Triage  Date of Test: 2025  Time frame of test: 9484-5829  RN NST Interpretation: Reactive      Veronica Shakir  2025  13:52 EDT  "

## 2025-07-05 ENCOUNTER — HOSPITAL ENCOUNTER (OUTPATIENT)
Facility: HOSPITAL | Age: 25
Discharge: HOME | End: 2025-07-05
Attending: OBSTETRICS & GYNECOLOGY | Admitting: OBSTETRICS & GYNECOLOGY
Payer: MEDICAID

## 2025-07-05 VITALS
HEART RATE: 88 BPM | RESPIRATION RATE: 18 BRPM | TEMPERATURE: 98.1 F | DIASTOLIC BLOOD PRESSURE: 81 MMHG | OXYGEN SATURATION: 98 % | SYSTOLIC BLOOD PRESSURE: 131 MMHG

## 2025-07-05 LAB
A1 MICROGLOB PLACENTAL VAG QL: NEGATIVE
BACTERIA UR QL AUTO: ABNORMAL /HPF
BILIRUB BLD-MCNC: NEGATIVE MG/DL
BILIRUB UR QL STRIP: NEGATIVE
CLARITY UR: ABNORMAL
CLARITY, POC: ABNORMAL
COLOR UR: YELLOW
COLOR UR: YELLOW
GLUCOSE UR STRIP-MCNC: NEGATIVE MG/DL
GLUCOSE UR STRIP-MCNC: NEGATIVE MG/DL
HGB UR QL STRIP.AUTO: ABNORMAL
HYALINE CASTS UR QL AUTO: ABNORMAL /LPF
KETONES UR QL STRIP: NEGATIVE
KETONES UR QL: NEGATIVE
LEUKOCYTE EST, POC: ABNORMAL
LEUKOCYTE ESTERASE UR QL STRIP.AUTO: ABNORMAL
NITRITE UR QL STRIP: NEGATIVE
NITRITE UR-MCNC: NEGATIVE MG/ML
PH UR STRIP.AUTO: 7 [PH] (ref 5–8)
PH UR: 7 [PH] (ref 5–8)
PROT UR QL STRIP: NEGATIVE
PROT UR STRIP-MCNC: NEGATIVE MG/DL
RBC # UR STRIP: ABNORMAL /HPF
RBC # UR STRIP: ABNORMAL /UL
REF LAB TEST METHOD: ABNORMAL
SP GR UR STRIP: 1.01 (ref 1–1.03)
SP GR UR: 1.02 (ref 1–1.03)
SQUAMOUS #/AREA URNS HPF: ABNORMAL /HPF
UROBILINOGEN UR QL STRIP: ABNORMAL
UROBILINOGEN UR QL: ABNORMAL
WBC # UR STRIP: ABNORMAL /HPF

## 2025-07-05 PROCEDURE — 81002 URINALYSIS NONAUTO W/O SCOPE: CPT | Performed by: OBSTETRICS & GYNECOLOGY

## 2025-07-05 PROCEDURE — 81001 URINALYSIS AUTO W/SCOPE: CPT | Performed by: OBSTETRICS & GYNECOLOGY

## 2025-07-05 PROCEDURE — 59025 FETAL NON-STRESS TEST: CPT

## 2025-07-05 PROCEDURE — 87086 URINE CULTURE/COLONY COUNT: CPT | Performed by: OBSTETRICS & GYNECOLOGY

## 2025-07-05 PROCEDURE — G0463 HOSPITAL OUTPT CLINIC VISIT: HCPCS

## 2025-07-05 PROCEDURE — 84112 EVAL AMNIOTIC FLUID PROTEIN: CPT | Performed by: OBSTETRICS & GYNECOLOGY

## 2025-07-05 RX ORDER — LABETALOL 100 MG/1
100 TABLET, FILM COATED ORAL 2 TIMES DAILY
COMMUNITY
Start: 2025-07-02

## 2025-07-05 NOTE — NON STRESS TEST
Obstetrical Non-stress Test Interpretation     Name:  Susan Palacios  MRN: [id number]    24 y.o. female  at 31w5d    Indication: leaking of fluid      Fetal Assessment  Fetal Movement: active  Fetal HR Assessment Method: external  Fetal HR (beats/min): 130  Fetal HR Baseline: normal range  Fetal HR Variability: moderate (amplitude range 6 to 25 bpm)  Fetal HR Accelerations: greater than/equal to 15 bpm, lasting at least 15 seconds  Fetal HR Decelerations: absent  Sinusoidal Pattern Present: absent    /81   Pulse 88   Temp 98.1 °F (36.7 °C) (Oral)   Resp 18   SpO2 98%     Reason for test: OB Triage (leaking of fluid)  Date of Test: 2025  Time frame of test:   RN NST Interpretation: Kate Moreno RN  2025  02:38 EDT

## 2025-07-05 NOTE — NURSING NOTE
Pt given discharge instructions and verbalized understanding. No questions at this time. Pt left unit ambulatory in no apparent distress accompanied by her mother.

## 2025-07-05 NOTE — NURSING NOTE
" 31w5d presents to L&D with c/o LOF that happened around 0015 this morning. Sates she is getting routine prenatal care at [facility] in [place] but was advised to be seen here since they do not have a NICU. Pt states she was recently diagnosed with pre eclampsia, no record found of this complaint that was reported. She states was in the kitchen and felt a \"pop\" and had a gush of clear fluid running down her legs. She has been feeling back pain but has been consistent before this occurrence. Transported to triage and place on EFM and TOCO. Denies vaginal bleeding, abd soft non tender. No cxns noted on the monitor. Urine collected, dipped, and sent to lab. See results. Vital signs WNL. SVE 0/0/-2 Amnisure collected and sent. Results negative. Notified Dr Sweet of above.  "

## 2025-07-06 LAB — BACTERIA SPEC AEROBE CULT: NO GROWTH

## 2025-07-07 NOTE — H&P
TIFFANIE Harrison  Obstetric History and Physical    Chief Complaint   Patient presents with    Leaking Fluid     Clear fluid at 0015 today and has continued leaking       Subjective     HPI:    Patient is a 24 y.o. female  currently at 32w0d, who presents with complaints of leaking clear fluid since 0015.  She felt a pop while in her kitchen and had a gush of fluid run down her leg.  She complained of back pain prior to having the gush of fluid.  No contractions were noted on the monitors.   She denies vaginal bleeding and reports fetal movement has been normal.  Cervix was closed and thick.  Amnisure was collected.    PNC provided by:  Dr Barraza [facility] in [place] . Her prenatal care is complicated by   Patient Active Problem List   Diagnosis    Pelvic cramping in antepartum period        Her previous obstetric/gynecological history is noted for .    The following portions of the patients history were reviewed and updated as appropriate:   current medications, allergies, past medical history, past surgical history, past family history, past social history and current problem list.     Prenatal Information:  Prenatal Results       Initial Prenatal Labs       Test Value Reference Range Date Time    Hemoglobin  13.3 g/dL 12.0 - 15.9 24 1308    Hematocrit  39.8 % 34.0 - 46.6 24 1308    Platelets  419 10*3/mm3 140 - 450 24 1308    Rubella IgG        Hepatitis B SAg        Hepatitis C Ab        RPR        T. Pallidum Ab   Non-Reactive  Non-Reactive 25    ABO  O   25 2242    Rh  Positive   25 2242    Antibody Screen ^ NEGATIVE   01/15/25 1611      ^ Performed at [facility]. [address] [place] [address]. Dr. Dean Costello (Medical Director).   01/15/25 1611       Negative   24 1308    HIV        Urine Culture  No growth   25 0127       No growth   25 1856    Gonorrhea  Not Detected  Not Detected 25    Chlamydia  Not Detected  Not  Detected 06/06/25 2003    TSH        HgB A1c         Varicella IgG        Hemoglobinopathy Fractionation        Hemoglobinopathy (genetic testing)        Cystic fibrosis         Spinal muscular atrophy        Fragile X                  Fetal testing        Test Value Reference Range Date Time    NIPT        MSAFP        AFP-4                  2nd and 3rd Trimester       Test Value Reference Range Date Time    Hemoglobin (repeated)  11.9 g/dL 12.0 - 15.9 07/02/25 1141       11.6 g/dL 12.0 - 15.9 06/07/25 0522       11.7 g/dL 12.0 - 15.9 06/06/25 2242       11.9 g/dL 12.0 - 15.9 06/06/25 1900    Hematocrit (repeated)  34.1 % 34.0 - 46.6 07/02/25 1141       34.4 % 34.0 - 46.6 06/07/25 0522       34.1 % 34.0 - 46.6 06/06/25 2242       34.6 % 34.0 - 46.6 06/06/25 1900    Platelets   382 10*3/mm3 140 - 450 07/02/25 1141       339 10*3/mm3 140 - 450 06/07/25 0522       372 10*3/mm3 140 - 450 06/06/25 2242       369 10*3/mm3 140 - 450 06/06/25 1900       419 10*3/mm3 140 - 450 12/31/24 1308    1 hour GTT         Antibody Screen (repeated)  Negative   06/06/25 2242    3rd TM syphilis scrn (repeated)  RPR         3rd TM syphilis scrn (repeated) TP-Ab  Non-Reactive  Non-Reactive 06/06/25 1959    3rd TM syphilis screen TB-Ab (FTA)  Non-Reactive  Non-Reactive 06/06/25 1959    Syphilis cascade test TP-Ab (EIA)        Syphilis cascade TPPA        GTT Fasting        GTT 1 Hr        GTT 2 Hr        GTT 3 Hr        Group B Strep                  Other testing        Test Value Reference Range Date Time    Parvo IgG         CMV IgG                   Drug Screening       Test Value Reference Range Date Time    Amphetamine Screen        Barbiturate Screen        Benzodiazepine Screen        Methadone Screen        Phencyclidine Screen        Opiates Screen        THC Screen        Cocaine Screen        Propoxyphene Screen        Buprenorphine Screen        Methamphetamine Screen        Oxycodone Screen        Tricyclic Antidepressants  Screen                  Legend    ^: Historical                          External Prenatal Results       Pregnancy Outside Results - Transcribed From Office Records - See Scanned Records For Details       Test Value Date Time    ABO  O  06/06/25 2242    Rh  Positive  06/06/25 2242    Antibody Screen  Negative  06/06/25 2242      ^ NEGATIVE  01/15/25 1611      ^ Performed at [facility]. [address] [place] [address]. Dr. Dean Costello (Medical Director).  01/15/25 1611       Negative  12/31/24 1308    Varicella IgG       Rubella       Hgb  11.9 g/dL 07/02/25 1141       11.6 g/dL 06/07/25 0522       11.7 g/dL 06/06/25 2242       11.9 g/dL 06/06/25 1900       13.3 g/dL 12/31/24 1308    Hct  34.1 % 07/02/25 1141       34.4 % 06/07/25 0522       34.1 % 06/06/25 2242       34.6 % 06/06/25 1900       39.8 % 12/31/24 1308    HgB A1c        1h GTT       3h GTT Fasting       3h GTT 1 hour       3h GTT 2 hour       3h GTT 3 hour        Gonorrhea (discrete)  Not Detected  06/06/25 2003    Chlamydia (discrete)  Not Detected  06/06/25 2003    RPR       Syphils cascade: TP-Ab (FTA)  Non-Reactive  06/06/25 1959    TP-Ab  Non-Reactive  06/06/25 1959    TP-Ab (EIA)       TPPA       HBsAg       Herpes Simplex Virus PCR       Herpes Simplex VIrus Culture       HIV       Hep C RNA Quant PCR ^ NONREACTIVE  01/15/25 1611    Hep C Antibody       AFP       NIPT       Cystic Fibrosis (Ari)       Cystic Fibroisis        Spinal Muscular atrophy       Fragile X       Group B Strep       GBS Susceptibility to Clindamycin       GBS Susceptibility to Erythromycin       Fetal Fibronectin  Positive  06/06/25 1855    Genetic Testing, Maternal Blood                 Drug Screening       Test Value Date Time    Urine Drug Screen       Amphetamine Screen       Barbiturate Screen       Benzodiazepine Screen       Methadone Screen       Phencyclidine Screen       Opiates Screen       THC Screen       Cocaine Screen       Propoxyphene Screen        Buprenorphine Screen       Methamphetamine Screen       Oxycodone Screen       Tricyclic Antidepressants Screen                 Legend    ^: Historical                             Past OB History:     OB History    Para Term  AB Living   1 0 0 0 0 0   SAB IAB Ectopic Molar Multiple Live Births   0 0 0 0 0 0      # Outcome Date GA Lbr Francois/2nd Weight Sex Type Anes PTL Lv   1 Current                Past Medical History: Past Medical History:   Diagnosis Date    Anxiety     Borderline personality disorder     PTSD (post-traumatic stress disorder)     Trauma       Past Surgical History No past surgical history on file.   Family History: Family History   Problem Relation Age of Onset    Diabetes Mother     Heart failure Mother       Social History:  reports that she has never smoked. She has never used smokeless tobacco.   reports no history of alcohol use.  Drug use questions deferred to the physician.        General ROS: Pertinent items are noted in HPI    Home Medications:  OXcarbazepine and labetalol    Allergies:  Allergies   Allergen Reactions    Shrimp Anaphylaxis    Tree Nut Anaphylaxis     Peanuts also       Objective       Vital Signs Range for the last 24 hours  Temperature:     Temp Source:     BP:     Pulse:     Respirations:     SPO2:       Physical Examination:   General appearance - alert, well appearing, and in no distress  Mental status - alert, oriented to person, place, and time  Chest - normal respiratory effort, no distress noted  Heart - normal rate, regular rhythm  Abdomen - soft, nontender, nondistended,   Pelvic - normal external genitalia, vulva, vagina, cervix, and uterus   Back exam - full range of motion, no tenderness or pain on motion  Neurological - alert, oriented, normal speech  Extremities - peripheral pulses normal  Skin - normal coloration and turgor, no suspicious skin lesions noted    Presentation: Uncertain   Cervix: Method: sterile vaginal exam performed (by T  Vandana AGUILAR)   Dilation: Cervical Dilation (cm): 0   Effacement: Cervical Effacement: 0   Station:         Fetal Heart Rate Assessment   Method: Fetal HR Assessment Method: external   Beats/min: Fetal HR (beats/min): 130   Baseline: Fetal HR Baseline: normal range   Variability: Fetal HR Variability: moderate (amplitude range 6 to 25 bpm)   Accels: Fetal HR Accelerations: greater than/equal to 15 bpm, lasting at least 15 seconds   Decels: Fetal HR Decelerations: absent   Tracing Category:       Uterine Assessment   Method: Method: external tocotransducer, palpation   Frequency (min):     Ctx Count in 10 min:     Duration:     Intensity: Contraction Intensity: no contractions   Isaban Units:       GBS is unknown     Results for orders placed or performed during the hospital encounter of 07/05/25   Rapid Assay For ROM - Amniotic Fluid, Amniotic Sac    Collection Time: 07/05/25  1:17 AM    Specimen: Amniotic Sac; Amniotic Fluid   Result Value Ref Range    Rupture of Membranes Negative Negative   POC Urinalysis Dipstick    Collection Time: 07/05/25  1:24 AM    Specimen: Urine   Result Value Ref Range    Color Yellow Yellow, Straw, Dark Yellow, Madeline    Clarity, UA Cloudy (A) Clear    Glucose, UA Negative Negative mg/dL    Bilirubin Negative Negative    Ketones, UA Negative Negative    Specific Gravity  1.020 1.005 - 1.030    Blood, UA Trace (A) Negative    pH, Urine 7.0 5.0 - 8.0    Protein, POC Negative Negative mg/dL    Urobilinogen, UA 0.2 E.U./dL Normal, 0.2 E.U./dL    Leukocytes Large (3+) (A) Negative    Nitrite, UA Negative Negative   Urine Culture - Urine, Urine, Clean Catch    Collection Time: 07/05/25  1:27 AM    Specimen: Urine, Clean Catch   Result Value Ref Range    Urine Culture No growth    Urinalysis With Culture If Indicated - Urine, Clean Catch    Collection Time: 07/05/25  1:27 AM    Specimen: Urine, Clean Catch   Result Value Ref Range    Color, UA Yellow Yellow, Straw    Appearance, UA Cloudy  "(A) Clear    pH, UA 7.0 5.0 - 8.0    Specific Gravity, UA 1.010 1.005 - 1.030    Glucose, UA Negative Negative    Ketones, UA Negative Negative    Bilirubin, UA Negative Negative    Blood, UA Trace (A) Negative    Protein, UA Negative Negative    Leuk Esterase, UA Moderate (2+) (A) Negative    Nitrite, UA Negative Negative    Urobilinogen, UA 0.2 E.U./dL 0.2 - 1.0 E.U./dL   Urinalysis, Microscopic Only - Urine, Clean Catch    Collection Time: 25  1:27 AM    Specimen: Urine, Clean Catch   Result Value Ref Range    RBC, UA 0-2 None Seen, 0-2 /HPF    WBC, UA Too Numerous to Count (A) None Seen, 0-2 /HPF    Bacteria, UA 2+ (A) None Seen /HPF    Squamous Epithelial Cells, UA 21-30 (A) None Seen, 0-2 /HPF    Hyaline Casts, UA 0-2 None Seen /LPF    Methodology Manual Light Microscopy         Assessment & Plan       * No active hospital problems. *        Assessment:  1.  Intrauterine pregnancy at 32w0d gestation with reactive, reassuring fetal status.    2.  Amni-sure negative.  Membranes appear intact with no evidence of PPROM.  3.  Obstetrical history significant for .  4.  GBS status: No results found for: \"STREPGPB\"    Plan:  1. Discharge to home.    2.  Plan of care has been reviewed with patient and patient agrees.   3.  Risks, benefits of treatment plan have been discussed.  4.  All questions have been answered.  5.   labor precautions given.  6.  Keep next routine OB appointment with Dr Barraza scheduled for 25.    Annia Sweet MD        Electronically signed by Annia Sweet MD, 25, 11:50 AM EDT.     "